# Patient Record
Sex: MALE | Race: ASIAN | HISPANIC OR LATINO | URBAN - METROPOLITAN AREA
[De-identification: names, ages, dates, MRNs, and addresses within clinical notes are randomized per-mention and may not be internally consistent; named-entity substitution may affect disease eponyms.]

---

## 2021-05-31 ENCOUNTER — RECORDS - HEALTHEAST (OUTPATIENT)
Dept: ADMINISTRATIVE | Facility: CLINIC | Age: 56
End: 2021-05-31

## 2021-06-01 ENCOUNTER — RECORDS - HEALTHEAST (OUTPATIENT)
Dept: ADMINISTRATIVE | Facility: CLINIC | Age: 56
End: 2021-06-01

## 2024-06-01 ENCOUNTER — TRANSFERRED RECORDS (OUTPATIENT)
Dept: MULTI SPECIALTY CLINIC | Facility: CLINIC | Age: 59
End: 2024-06-01

## 2024-06-01 LAB — RETINOPATHY: NORMAL

## 2024-07-23 ENCOUNTER — TRANSFERRED RECORDS (OUTPATIENT)
Dept: HEALTH INFORMATION MANAGEMENT | Facility: CLINIC | Age: 59
End: 2024-07-23

## 2024-07-25 ENCOUNTER — APPOINTMENT (OUTPATIENT)
Dept: INTERPRETER SERVICES | Facility: CLINIC | Age: 59
End: 2024-07-25
Payer: MEDICAID

## 2024-08-07 ENCOUNTER — ANCILLARY PROCEDURE (OUTPATIENT)
Dept: GENERAL RADIOLOGY | Facility: CLINIC | Age: 59
End: 2024-08-07
Attending: INTERNAL MEDICINE
Payer: COMMERCIAL

## 2024-08-07 ENCOUNTER — OFFICE VISIT (OUTPATIENT)
Dept: INTERNAL MEDICINE | Facility: CLINIC | Age: 59
End: 2024-08-07
Payer: COMMERCIAL

## 2024-08-07 VITALS
TEMPERATURE: 98.3 F | SYSTOLIC BLOOD PRESSURE: 160 MMHG | RESPIRATION RATE: 12 BRPM | HEART RATE: 97 BPM | WEIGHT: 213 LBS | HEIGHT: 64 IN | DIASTOLIC BLOOD PRESSURE: 107 MMHG | OXYGEN SATURATION: 96 % | BODY MASS INDEX: 36.37 KG/M2

## 2024-08-07 DIAGNOSIS — I10 PRIMARY HYPERTENSION: ICD-10-CM

## 2024-08-07 DIAGNOSIS — E66.01 CLASS 2 SEVERE OBESITY DUE TO EXCESS CALORIES WITH SERIOUS COMORBIDITY AND BODY MASS INDEX (BMI) OF 37.0 TO 37.9 IN ADULT (H): ICD-10-CM

## 2024-08-07 DIAGNOSIS — F33.1 MODERATE EPISODE OF RECURRENT MAJOR DEPRESSIVE DISORDER (H): ICD-10-CM

## 2024-08-07 DIAGNOSIS — E78.2 MIXED HYPERLIPIDEMIA: ICD-10-CM

## 2024-08-07 DIAGNOSIS — E11.9 TYPE 2 DIABETES MELLITUS WITHOUT COMPLICATION, WITHOUT LONG-TERM CURRENT USE OF INSULIN (H): ICD-10-CM

## 2024-08-07 DIAGNOSIS — E66.812 CLASS 2 SEVERE OBESITY DUE TO EXCESS CALORIES WITH SERIOUS COMORBIDITY AND BODY MASS INDEX (BMI) OF 37.0 TO 37.9 IN ADULT (H): ICD-10-CM

## 2024-08-07 DIAGNOSIS — G89.29 CHRONIC PAIN OF BOTH KNEES: ICD-10-CM

## 2024-08-07 DIAGNOSIS — M25.542 ARTHRALGIA OF BOTH HANDS: ICD-10-CM

## 2024-08-07 DIAGNOSIS — M25.541 ARTHRALGIA OF BOTH HANDS: ICD-10-CM

## 2024-08-07 DIAGNOSIS — Z12.5 SCREENING FOR PROSTATE CANCER: ICD-10-CM

## 2024-08-07 DIAGNOSIS — M25.561 CHRONIC PAIN OF BOTH KNEES: ICD-10-CM

## 2024-08-07 DIAGNOSIS — Z00.00 PHYSICAL EXAM, ANNUAL: Primary | ICD-10-CM

## 2024-08-07 DIAGNOSIS — G47.9 SLEEP DISTURBANCE: ICD-10-CM

## 2024-08-07 DIAGNOSIS — R07.89 OTHER CHEST PAIN: ICD-10-CM

## 2024-08-07 DIAGNOSIS — E03.9 ACQUIRED HYPOTHYROIDISM: ICD-10-CM

## 2024-08-07 DIAGNOSIS — M25.562 CHRONIC PAIN OF BOTH KNEES: ICD-10-CM

## 2024-08-07 LAB
CREAT UR-MCNC: 159 MG/DL
ERYTHROCYTE [DISTWIDTH] IN BLOOD BY AUTOMATED COUNT: 12.2 % (ref 10–15)
ERYTHROCYTE [SEDIMENTATION RATE] IN BLOOD BY WESTERGREN METHOD: 8 MM/HR (ref 0–20)
HBA1C MFR BLD: 8.4 % (ref 0–5.6)
HCT VFR BLD AUTO: 49 % (ref 40–53)
HGB BLD-MCNC: 16.1 G/DL (ref 13.3–17.7)
MCH RBC QN AUTO: 29.3 PG (ref 26.5–33)
MCHC RBC AUTO-ENTMCNC: 32.9 G/DL (ref 31.5–36.5)
MCV RBC AUTO: 89 FL (ref 78–100)
MICROALBUMIN UR-MCNC: <12 MG/L
MICROALBUMIN/CREAT UR: NORMAL MG/G{CREAT}
PLATELET # BLD AUTO: 243 10E3/UL (ref 150–450)
RBC # BLD AUTO: 5.49 10E6/UL (ref 4.4–5.9)
RHEUMATOID FACT SERPL-ACNC: <10 IU/ML
WBC # BLD AUTO: 6.5 10E3/UL (ref 4–11)

## 2024-08-07 PROCEDURE — 83036 HEMOGLOBIN GLYCOSYLATED A1C: CPT | Performed by: INTERNAL MEDICINE

## 2024-08-07 PROCEDURE — 85027 COMPLETE CBC AUTOMATED: CPT | Performed by: INTERNAL MEDICINE

## 2024-08-07 PROCEDURE — 82570 ASSAY OF URINE CREATININE: CPT | Performed by: INTERNAL MEDICINE

## 2024-08-07 PROCEDURE — G0103 PSA SCREENING: HCPCS | Performed by: INTERNAL MEDICINE

## 2024-08-07 PROCEDURE — 86038 ANTINUCLEAR ANTIBODIES: CPT | Performed by: INTERNAL MEDICINE

## 2024-08-07 PROCEDURE — 86431 RHEUMATOID FACTOR QUANT: CPT | Performed by: INTERNAL MEDICINE

## 2024-08-07 PROCEDURE — 85652 RBC SED RATE AUTOMATED: CPT | Performed by: INTERNAL MEDICINE

## 2024-08-07 PROCEDURE — 82043 UR ALBUMIN QUANTITATIVE: CPT | Performed by: INTERNAL MEDICINE

## 2024-08-07 PROCEDURE — 99214 OFFICE O/P EST MOD 30 MIN: CPT | Mod: 25 | Performed by: INTERNAL MEDICINE

## 2024-08-07 PROCEDURE — 93005 ELECTROCARDIOGRAM TRACING: CPT | Performed by: INTERNAL MEDICINE

## 2024-08-07 PROCEDURE — T1013 SIGN LANG/ORAL INTERPRETER: HCPCS | Mod: U4 | Performed by: INTERPRETER

## 2024-08-07 PROCEDURE — 99386 PREV VISIT NEW AGE 40-64: CPT | Performed by: INTERNAL MEDICINE

## 2024-08-07 PROCEDURE — 96127 BRIEF EMOTIONAL/BEHAV ASSMT: CPT | Performed by: INTERNAL MEDICINE

## 2024-08-07 PROCEDURE — 71046 X-RAY EXAM CHEST 2 VIEWS: CPT | Mod: TC | Performed by: RADIOLOGY

## 2024-08-07 PROCEDURE — 36415 COLL VENOUS BLD VENIPUNCTURE: CPT | Performed by: INTERNAL MEDICINE

## 2024-08-07 RX ORDER — ASPIRIN 81 MG/1
TABLET ORAL
COMMUNITY
Start: 2024-07-23

## 2024-08-07 RX ORDER — NAPROXEN 500 MG/1
TABLET ORAL
COMMUNITY

## 2024-08-07 RX ORDER — PIOGLITAZONEHYDROCHLORIDE 45 MG/1
TABLET ORAL
COMMUNITY
Start: 2024-07-23 | End: 2024-09-09

## 2024-08-07 RX ORDER — CYCLOBENZAPRINE HCL 10 MG
TABLET ORAL
COMMUNITY

## 2024-08-07 RX ORDER — ATORVASTATIN CALCIUM 40 MG/1
TABLET, FILM COATED ORAL
COMMUNITY
Start: 2024-07-23

## 2024-08-07 RX ORDER — GABAPENTIN 300 MG/1
CAPSULE ORAL
COMMUNITY

## 2024-08-07 RX ORDER — GLIPIZIDE 10 MG/1
TABLET, FILM COATED, EXTENDED RELEASE ORAL
COMMUNITY
Start: 2024-07-23 | End: 2024-09-09

## 2024-08-07 RX ORDER — LOSARTAN POTASSIUM AND HYDROCHLOROTHIAZIDE 25; 100 MG/1; MG/1
TABLET ORAL
COMMUNITY
Start: 2024-07-23

## 2024-08-07 RX ORDER — LEVOTHYROXINE SODIUM 150 UG/1
TABLET ORAL
COMMUNITY
Start: 2024-07-23

## 2024-08-07 SDOH — HEALTH STABILITY: PHYSICAL HEALTH: ON AVERAGE, HOW MANY DAYS PER WEEK DO YOU ENGAGE IN MODERATE TO STRENUOUS EXERCISE (LIKE A BRISK WALK)?: 2 DAYS

## 2024-08-07 SDOH — HEALTH STABILITY: PHYSICAL HEALTH: ON AVERAGE, HOW MANY MINUTES DO YOU ENGAGE IN EXERCISE AT THIS LEVEL?: 20 MIN

## 2024-08-07 ASSESSMENT — PATIENT HEALTH QUESTIONNAIRE - PHQ9
SUM OF ALL RESPONSES TO PHQ QUESTIONS 1-9: 22
10. IF YOU CHECKED OFF ANY PROBLEMS, HOW DIFFICULT HAVE THESE PROBLEMS MADE IT FOR YOU TO DO YOUR WORK, TAKE CARE OF THINGS AT HOME, OR GET ALONG WITH OTHER PEOPLE: EXTREMELY DIFFICULT
SUM OF ALL RESPONSES TO PHQ QUESTIONS 1-9: 22

## 2024-08-07 ASSESSMENT — SOCIAL DETERMINANTS OF HEALTH (SDOH): HOW OFTEN DO YOU GET TOGETHER WITH FRIENDS OR RELATIVES?: NEVER

## 2024-08-07 ASSESSMENT — PAIN SCALES - GENERAL: PAINLEVEL: MILD PAIN (3)

## 2024-08-07 NOTE — PROGRESS NOTES
Preventive Care Visit  [facility]  Clemente Frey MD, Internal Medicine  Aug 7, 2024      Assessment & Plan     (Z00.00) Physical exam, annual  (primary encounter diagnosis)  Comment: essentially normal exam  Plan: chronic conditions reviewed, some items including a number of HM items will require revisiting at his next visit later this month. Discussed.    (F33.1) Moderate episode of recurrent major depressive disorder (H)  Comment: prior therapist but not sure if he has ever been on medication.  Plan: FLUoxetine (PROZAC) 20 MG capsule        He is interested in pharmacotherapy. Appears to have both chronic depression as well as situational stress per recent notes from outside clinic--family related. Plan to see me in 3 weeks for follow up on progress.    (E11.9) Type 2 diabetes mellitus without complication, without long-term current use of insulin (H)  Comment: hx of. On multiple oral medication.   Plan: Hemoglobin A1c, Albumin Random Urine         Quantitative with Creat Ratio        Recheck labs today. Will need to assess need for eye exam at next visit--records are difficult to obtain. Would likely benefit from MTM referral, possible transition insulin or GLP-1. Will review at our next visit in 3 weeks.    (I10) Primary hypertension  Comment: elevated today though apparently a bit better in prior visits at his other clinic  Plan: will reassess in three weeks though may need adjustment. He definitely was a been distressed today.    (E66.01,  Z68.37) Class 2 severe obesity due to excess calories with serious comorbidity and body mass index (BMI) of 37.0 to 37.9 in adult (H)  Comment: associated with HTN, Hyperlipidemia and DM.  Plan: lifestyle modifications required. Somewhat hampered by mental health status at this time. Will review again in the coming month.    (E78.2) Mixed hyperlipidemia  Comment: on statin  Plan: Will plan to continue on previous medication without changes  "    (E03.9) Acquired hypothyroidism  Comment: on replacement  Plan: I believe he had recent labs though can recheck down the road.    (R07.89) Other chest pain  Comment: nonspecific, does not appear to be exertional.  Plan: EKG 12-lead, tracing only, XR Chest 2 Views        No acute ischemic changes. Atypical CP overall though there is somewhat poor R wave progression and equivocal Qs in inferior leads. No evidence of HF though likely will need to assess further if sx are not improving over the next few weeks prior to our next visit in three weeks.    (M25.541,  M25.542) Arthralgia of both hands  Comment: exam is fairly unremarkable. Some knee pain as well.  Plan: Anti Nuclear Luh IgG by IFA with Reflex, CBC         with platelets, Rheumatoid factor, ESR:         Erythrocyte sedimentation rate        Suspect OA in knees, see below. Pertinent work up but overall low suspicion for autoimmune inflammatory arthritis. Tylenol as needed.    (G47.9) Sleep disturbance  Comment: Sx suggestive of sleep apnea with sudden awakenings and snoring though could also be related to uncontrolled depression/anxiety  Plan: Adult Sleep Eval & Management          Referral        Sleep clinic referral. Selective serotonin reuptake inhibitor as above also planned    (M25.561,  M25.562,  G89.29) Chronic pain of both knees  Comment: suspected. Exam with some crepitus but otherwise no internal derangement suspected.  Plan: Physical Therapy  Referral        Willing to try physical therapy. Tylenol as needed.    See me in 3 weeks  Immunizations and cancer screening to be discussed further at that time.          BMI  Estimated body mass index is 37.04 kg/m  as calculated from the following:    Height as of this encounter: 1.615 m (5' 3.58\").    Weight as of this encounter: 96.6 kg (213 lb).       Depression Screening Follow Up        8/7/2024     2:53 PM   PHQ   PHQ-9 Total Score 22   Q9: Thoughts of better off dead/self-harm " past 2 weeks Several days   F/U: Thoughts of suicide or self-harm Yes   F/U: Self harm-plan No   F/U: Self-harm action No   F/U: Safety concerns Yes                     Follow Up Actions Taken  Patient to follow up with PCP.  Clinic staff to schedule appointment if able.    Discussed the following ways the patient can remain in a safe environment:  be around others        Fariha Arthur is a 59 year old, presenting for the following:  Annual Visit and Establish Care (He is having breathing issues with SOB especially bad since March .He has knee and joint pain.  He states he has high blood pressure. He has a number of issues to discuss with Dr. Frey. Knee pain that has caused him to fall numerous times. Please verify medications.  He states he is on 3 diabetes medication.  He can't verify the names. His hand joints are very painful..  He is very depressed and anxious.)             HPI  Pt is here to establish care as well as get a physical  Multiple concerns today, records are somewhat sparse from the [facility]             8/7/2024   General Health   How would you rate your overall physical health? (!) POOR   Feel stress (tense, anxious, or unable to sleep) Very much      (!) STRESS CONCERN      8/7/2024   Nutrition   Three or more servings of calcium each day? Yes   Diet: Regular (no restrictions)   How many servings of fruit and vegetables per day? 4 or more   How many sweetened beverages each day? 0-1            8/7/2024   Exercise   Days per week of moderate/strenous exercise 2 days   Average minutes spent exercising at this level 20 min      (!) EXERCISE CONCERN      8/7/2024   Social Factors   Frequency of gathering with friends or relatives Never   Worry food won't last until get money to buy more Yes   Food not last or not have enough money for food? Yes   Do you have housing? (Housing is defined as stable permanent housing and does not include staying ouside in a car, in a tent, in an abandoned building, in  "an overnight shelter, or couch-surfing.) Yes   Are you worried about losing your housing? Yes   Lack of transportation? Yes   Unable to get utilities (heat,electricity)? Yes   Want help with housing or utility concern? (!) YES      (!) FOOD SECURITY CONCERN PRESENT (!) TRANSPORTATION CONCERN PRESENT(!) HOUSING CONCERN PRESENT(!) FINANCIAL RESOURCE STRAIN CONCERN(!) SOCIAL CONNECTIONS CONCERN      8/7/2024   Fall Risk   Fallen 2 or more times in the past year? Yes   Trouble with walking or balance? Yes   Reason Gait Speed Test Not Completed Patient does not comprehend instructions             8/7/2024   Dental   Dentist two times every year? (!) NO            8/7/2024   TB Screening   Were you born outside of the [place]? Yes          Today's PHQ-9 Score:       8/7/2024     2:53 PM   PHQ-9 SCORE   PHQ-9 Total Score MyChart 22 (Severe depression)   PHQ-9 Total Score 22         8/7/2024   Substance Use   Alcohol more than 3/day or more than 7/wk No   Do you use any other substances recreationally? No        Social History     Tobacco Use    Smoking status: Never    Smokeless tobacco: Never   Vaping Use    Vaping status: Never Used             8/7/2024   One time HIV Screening   Previous HIV test? No          8/7/2024   STI Screening   New sexual partner(s) since last STI/HIV test? No      Last PSA: No results found for: \"PSA\"  ASCVD Risk   The ASCVD Risk score (Janay LAAL, et al., 2019) failed to calculate for the following reasons:    Cannot find a previous HDL lab    Cannot find a previous total cholesterol lab           Reviewed and updated as needed this visit by Provider                             Objective    Exam  BP (!) 157/113 (BP Location: Left arm, Patient Position: Sitting, Cuff Size: Adult Regular)   Pulse 97   Temp 98.3  F (36.8  C) (Tympanic)   Resp 12   Ht 1.615 m (5' 3.58\")   Wt 96.6 kg (213 lb)   SpO2 96%   BMI 37.04 kg/m     Estimated body mass index is 37.04 kg/m  as calculated from the " "following:    Height as of this encounter: 1.615 m (5' 3.58\").    Weight as of this encounter: 96.6 kg (213 lb).    Physical Exam  GENERAL: alert and no distress  NECK: no adenopathy, no asymmetry, masses, or scars  RESP: lungs clear to auscultation - no rales, rhonchi or wheezes  CV: regular rate and rhythm, normal S1 S2, no S3 or S4, no murmur, click or rub, no peripheral edema  ABDOMEN: soft, nontender, no hepatosplenomegaly, no masses and bowel sounds normal  MS: mild crepitus with lower leg extension bilateral. No obvious effusion. No point tenderness of significance. Good stability.  NEURO: Normal strength and tone, mentation intact and speech normal  PSYCH: mentation appears normal, affect normal/bright        Signed Electronically by: Clemente Frey MD    "

## 2024-08-07 NOTE — LETTER
August 8, 2024      Jerson Lynn  [address] [place] [address]        Dear ,    We are writing to inform you of your test results.    Jerson,   Your xray looks normal.   Your labs show that your diabetes is not well controlled but we can talk more about this when I see you again   The EKG has some minor abnormalities though it is not urgent. We can talk more about this when I see you again as well.     Resulted Orders   Anti Nuclear Luh IgG by IFA with Reflex   Result Value Ref Range    MARIE interpretation Negative Negative      Comment:        Negative:              <1:40  Borderline Positive:   1:40 - 1:80  Positive:              >1:80   CBC with platelets   Result Value Ref Range    WBC Count 6.5 4.0 - 11.0 10e3/uL    RBC Count 5.49 4.40 - 5.90 10e6/uL    Hemoglobin 16.1 13.3 - 17.7 g/dL    Hematocrit 49.0 40.0 - 53.0 %    MCV 89 78 - 100 fL    MCH 29.3 26.5 - 33.0 pg    MCHC 32.9 31.5 - 36.5 g/dL    RDW 12.2 10.0 - 15.0 %    Platelet Count 243 150 - 450 10e3/uL   Rheumatoid factor   Result Value Ref Range    Rheumatoid Factor <10 <14 IU/mL   ESR: Erythrocyte sedimentation rate   Result Value Ref Range    Erythrocyte Sedimentation Rate 8 0 - 20 mm/hr   Hemoglobin A1c   Result Value Ref Range    Hemoglobin A1C 8.4 (H) 0.0 - 5.6 %      Comment:      Normal <5.7%   Prediabetes 5.7-6.4%    Diabetes 6.5% or higher     Note: Adopted from ADA consensus guidelines.   Albumin Random Urine Quantitative with Creat Ratio   Result Value Ref Range    Creatinine Urine mg/dL 159.0 mg/dL      Comment:      The reference ranges have not been established in urine creatinine. The results should be integrated into the clinical context for interpretation.    Albumin Urine mg/L <12.0 mg/L      Comment:      The reference ranges have not been established in urine albumin. The results should be integrated into the clinical context for interpretation.    Albumin Urine mg/g Cr        Comment:      Unable to calculate, urine albumin  and/or urine creatinine is outside detectable limits.  Microalbuminuria is defined as an albumin:creatinine ratio of 17 to 299 for males and 25 to 299 for females. A ratio of albumin:creatinine of 300 or higher is indicative of overt proteinuria.  Due to biologic variability, positive results should be confirmed by a second, first-morning random or 24-hour timed urine specimen. If there is discrepancy, a third specimen is recommended. When 2 out of 3 results are in the microalbuminuria range, this is evidence for incipient nephropathy and warrants increased efforts at glucose control, blood pressure control, and institution of therapy with an angiotensin-converting-enzyme (ACE) inhibitor (if the patient can tolerate it).     PSA, screen   Result Value Ref Range    Prostate Specific Antigen Screen 1.91 0.00 - 3.50 ng/mL    Narrative    This result is obtained using the Roche Elecsys total PSA method on the [place] e801 immunoassay analyzer. Results obtained with different assay methods or kits cannot be used interchangeably.       If you have any questions or concerns, please call the clinic at the number listed above.       Sincerely,      Clemente Frey MD

## 2024-08-08 ENCOUNTER — APPOINTMENT (OUTPATIENT)
Dept: INTERPRETER SERVICES | Facility: CLINIC | Age: 59
End: 2024-08-08
Payer: COMMERCIAL

## 2024-08-08 LAB
ANA SER QL IF: NEGATIVE
ATRIAL RATE - MUSE: 72 BPM
DIASTOLIC BLOOD PRESSURE - MUSE: NORMAL MMHG
INTERPRETATION ECG - MUSE: NORMAL
P AXIS - MUSE: 19 DEGREES
PR INTERVAL - MUSE: 186 MS
PSA SERPL DL<=0.01 NG/ML-MCNC: 1.91 NG/ML (ref 0–3.5)
QRS DURATION - MUSE: 88 MS
QT - MUSE: 386 MS
QTC - MUSE: 422 MS
R AXIS - MUSE: -41 DEGREES
SYSTOLIC BLOOD PRESSURE - MUSE: NORMAL MMHG
T AXIS - MUSE: 8 DEGREES
VENTRICULAR RATE- MUSE: 72 BPM

## 2024-08-08 PROCEDURE — 93010 ELECTROCARDIOGRAM REPORT: CPT | Performed by: STUDENT IN AN ORGANIZED HEALTH CARE EDUCATION/TRAINING PROGRAM

## 2024-08-09 ENCOUNTER — THERAPY VISIT (OUTPATIENT)
Dept: PHYSICAL THERAPY | Facility: REHABILITATION | Age: 59
End: 2024-08-09
Attending: INTERNAL MEDICINE
Payer: COMMERCIAL

## 2024-08-09 DIAGNOSIS — G89.29 CHRONIC PAIN OF BOTH KNEES: Primary | ICD-10-CM

## 2024-08-09 DIAGNOSIS — M25.562 CHRONIC PAIN OF BOTH KNEES: Primary | ICD-10-CM

## 2024-08-09 DIAGNOSIS — M25.561 CHRONIC PAIN OF BOTH KNEES: Primary | ICD-10-CM

## 2024-08-09 PROCEDURE — 97110 THERAPEUTIC EXERCISES: CPT | Mod: GP | Performed by: PHYSICAL THERAPIST

## 2024-08-09 PROCEDURE — 97161 PT EVAL LOW COMPLEX 20 MIN: CPT | Mod: GP | Performed by: PHYSICAL THERAPIST

## 2024-08-09 ASSESSMENT — ACTIVITIES OF DAILY LIVING (ADL)
SWELLING: I DO NOT HAVE THE SYMPTOM
STIFFNESS: THE SYMPTOM AFFECTS MY ACTIVITY MODERATELY
SWELLING: I DO NOT HAVE THE SYMPTOM
SQUAT: I AM UNABLE TO DO THE ACTIVITY
GO UP STAIRS: ACTIVITY IS VERY DIFFICULT
AS_A_RESULT_OF_YOUR_KNEE_INJURY,_HOW_WOULD_YOU_RATE_YOUR_CURRENT_LEVEL_OF_DAILY_ACTIVITY?: SEVERELY ABNORMAL
STAND: ACTIVITY IS VERY DIFFICULT
RISE FROM A CHAIR: ACTIVITY IS FAIRLY DIFFICULT
HOW_WOULD_YOU_RATE_THE_OVERALL_FUNCTION_OF_YOUR_KNEE_DURING_YOUR_USUAL_DAILY_ACTIVITIES?: SEVERELY ABNORMAL
WEAKNESS: THE SYMPTOM AFFECTS MY ACTIVITY MODERATELY
PAIN: THE SYMPTOM AFFECTS MY ACTIVITY SLIGHTLY
GO DOWN STAIRS: ACTIVITY IS VERY DIFFICULT
GIVING WAY, BUCKLING OR SHIFTING OF KNEE: THE SYMPTOM AFFECTS MY ACTIVITY MODERATELY
KNEE_ACTIVITY_OF_DAILY_LIVING_SCORE: 35.71
RAW_SCORE: 25
PLEASE_INDICATE_YOR_PRIMARY_REASON_FOR_REFERRAL_TO_THERAPY:: KNEE
WALK: ACTIVITY IS VERY DIFFICULT
GO UP STAIRS: ACTIVITY IS VERY DIFFICULT
AS_A_RESULT_OF_YOUR_KNEE_INJURY,_HOW_WOULD_YOU_RATE_YOUR_CURRENT_LEVEL_OF_DAILY_ACTIVITY?: SEVERELY ABNORMAL
GO DOWN STAIRS: ACTIVITY IS VERY DIFFICULT
HOW_WOULD_YOU_RATE_THE_OVERALL_FUNCTION_OF_YOUR_KNEE_DURING_YOUR_USUAL_DAILY_ACTIVITIES?: SEVERELY ABNORMAL
STIFFNESS: THE SYMPTOM AFFECTS MY ACTIVITY MODERATELY
LIMPING: THE SYMPTOM AFFECTS MY ACTIVITY SEVERELY
KNEEL ON THE FRONT OF YOUR KNEE: I AM UNABLE TO DO THE ACTIVITY
SQUAT: I AM UNABLE TO DO THE ACTIVITY
PAIN: THE SYMPTOM AFFECTS MY ACTIVITY SLIGHTLY
STAND: ACTIVITY IS VERY DIFFICULT
RISE FROM A CHAIR: ACTIVITY IS FAIRLY DIFFICULT
LIMPING: THE SYMPTOM AFFECTS MY ACTIVITY SEVERELY
WEAKNESS: THE SYMPTOM AFFECTS MY ACTIVITY MODERATELY
KNEE_ACTIVITY_OF_DAILY_LIVING_SUM: 25
WALK: ACTIVITY IS VERY DIFFICULT
KNEEL ON THE FRONT OF YOUR KNEE: I AM UNABLE TO DO THE ACTIVITY
SIT WITH YOUR KNEE BENT: ACTIVITY IS MINIMALLY DIFFICULT
SIT WITH YOUR KNEE BENT: ACTIVITY IS MINIMALLY DIFFICULT
GIVING WAY, BUCKLING OR SHIFTING OF KNEE: THE SYMPTOM AFFECTS MY ACTIVITY MODERATELY
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
## 2024-08-13 ENCOUNTER — THERAPY VISIT (OUTPATIENT)
Dept: PHYSICAL THERAPY | Facility: REHABILITATION | Age: 59
End: 2024-08-13
Attending: INTERNAL MEDICINE
Payer: COMMERCIAL

## 2024-08-13 DIAGNOSIS — M25.562 CHRONIC PAIN OF BOTH KNEES: Primary | ICD-10-CM

## 2024-08-13 DIAGNOSIS — G89.29 CHRONIC PAIN OF BOTH KNEES: Primary | ICD-10-CM

## 2024-08-13 DIAGNOSIS — M25.561 CHRONIC PAIN OF BOTH KNEES: Primary | ICD-10-CM

## 2024-08-13 PROCEDURE — 97110 THERAPEUTIC EXERCISES: CPT | Mod: GP | Performed by: PHYSICAL THERAPIST

## 2024-08-13 PROCEDURE — 97140 MANUAL THERAPY 1/> REGIONS: CPT | Mod: GP | Performed by: PHYSICAL THERAPIST

## 2024-08-28 ENCOUNTER — OFFICE VISIT (OUTPATIENT)
Dept: INTERNAL MEDICINE | Facility: CLINIC | Age: 59
End: 2024-08-28
Payer: COMMERCIAL

## 2024-08-28 VITALS
SYSTOLIC BLOOD PRESSURE: 137 MMHG | OXYGEN SATURATION: 97 % | HEART RATE: 82 BPM | TEMPERATURE: 97.5 F | BODY MASS INDEX: 37.92 KG/M2 | DIASTOLIC BLOOD PRESSURE: 96 MMHG | RESPIRATION RATE: 18 BRPM | HEIGHT: 63 IN | WEIGHT: 214 LBS

## 2024-08-28 DIAGNOSIS — E11.9 TYPE 2 DIABETES MELLITUS WITHOUT COMPLICATION, WITHOUT LONG-TERM CURRENT USE OF INSULIN (H): Primary | ICD-10-CM

## 2024-08-28 DIAGNOSIS — E03.9 ACQUIRED HYPOTHYROIDISM: ICD-10-CM

## 2024-08-28 DIAGNOSIS — Z12.11 SCREEN FOR COLON CANCER: ICD-10-CM

## 2024-08-28 DIAGNOSIS — I10 PRIMARY HYPERTENSION: ICD-10-CM

## 2024-08-28 DIAGNOSIS — R94.31 ABNORMAL ELECTROCARDIOGRAM: ICD-10-CM

## 2024-08-28 DIAGNOSIS — F33.1 MODERATE EPISODE OF RECURRENT MAJOR DEPRESSIVE DISORDER (H): ICD-10-CM

## 2024-08-28 DIAGNOSIS — E78.2 MIXED HYPERLIPIDEMIA: ICD-10-CM

## 2024-08-28 LAB
ALBUMIN SERPL BCG-MCNC: 4.2 G/DL (ref 3.5–5.2)
ALP SERPL-CCNC: 85 U/L (ref 40–150)
ALT SERPL W P-5'-P-CCNC: 31 U/L (ref 0–70)
ANION GAP SERPL CALCULATED.3IONS-SCNC: 10 MMOL/L (ref 7–15)
AST SERPL W P-5'-P-CCNC: 26 U/L (ref 0–45)
BILIRUB SERPL-MCNC: 0.7 MG/DL
BUN SERPL-MCNC: 14.9 MG/DL (ref 6–20)
CALCIUM SERPL-MCNC: 9.3 MG/DL (ref 8.8–10.4)
CHLORIDE SERPL-SCNC: 102 MMOL/L (ref 98–107)
CHOLEST SERPL-MCNC: 131 MG/DL
CREAT SERPL-MCNC: 1.16 MG/DL (ref 0.67–1.17)
EGFRCR SERPLBLD CKD-EPI 2021: 73 ML/MIN/1.73M2
FASTING STATUS PATIENT QL REPORTED: YES
FASTING STATUS PATIENT QL REPORTED: YES
GLUCOSE SERPL-MCNC: 157 MG/DL (ref 70–99)
HCO3 SERPL-SCNC: 29 MMOL/L (ref 22–29)
HDLC SERPL-MCNC: 32 MG/DL
LDLC SERPL CALC-MCNC: 54 MG/DL
NONHDLC SERPL-MCNC: 99 MG/DL
POTASSIUM SERPL-SCNC: 3.4 MMOL/L (ref 3.4–5.3)
PROT SERPL-MCNC: 7.4 G/DL (ref 6.4–8.3)
SODIUM SERPL-SCNC: 141 MMOL/L (ref 135–145)
TRIGL SERPL-MCNC: 226 MG/DL
TSH SERPL DL<=0.005 MIU/L-ACNC: 2.59 UIU/ML (ref 0.3–4.2)

## 2024-08-28 PROCEDURE — 99214 OFFICE O/P EST MOD 30 MIN: CPT | Performed by: INTERNAL MEDICINE

## 2024-08-28 PROCEDURE — T1013 SIGN LANG/ORAL INTERPRETER: HCPCS | Mod: U4 | Performed by: INTERPRETER

## 2024-08-28 PROCEDURE — 36415 COLL VENOUS BLD VENIPUNCTURE: CPT | Performed by: INTERNAL MEDICINE

## 2024-08-28 PROCEDURE — 84443 ASSAY THYROID STIM HORMONE: CPT | Performed by: INTERNAL MEDICINE

## 2024-08-28 PROCEDURE — 80053 COMPREHEN METABOLIC PANEL: CPT | Performed by: INTERNAL MEDICINE

## 2024-08-28 PROCEDURE — 80061 LIPID PANEL: CPT | Performed by: INTERNAL MEDICINE

## 2024-08-28 PROCEDURE — G2211 COMPLEX E/M VISIT ADD ON: HCPCS | Performed by: INTERNAL MEDICINE

## 2024-08-28 RX ORDER — FLUOXETINE 40 MG/1
40 CAPSULE ORAL DAILY
Qty: 90 CAPSULE | Refills: 1 | Status: SHIPPED | OUTPATIENT
Start: 2024-08-28

## 2024-08-28 NOTE — LETTER
August 29, 2024      Jerson Lynn  [address] [place] [address]        Dear ,    We are writing to inform you of your test results.        Resulted Orders   Lipid panel reflex to direct LDL Non-fasting   Result Value Ref Range    Cholesterol 131 <200 mg/dL    Triglycerides 226 (H) <150 mg/dL    Direct Measure HDL 32 (L) >=40 mg/dL    LDL Cholesterol Calculated 54 <=100 mg/dL    Non HDL Cholesterol 99 <130 mg/dL    Patient Fasting > 8hrs? Yes     Narrative    Cholesterol  Desirable:  <200 mg/dL    Triglycerides  Normal:  Less than 150 mg/dL  Borderline High:  150-199 mg/dL  High:  200-499 mg/dL  Very High:  Greater than or equal to 500 mg/dL    Direct Measure HDL  Female:  Greater than or equal to 50 mg/dL   Male:  Greater than or equal to 40 mg/dL    LDL Cholesterol  Desirable:  <100mg/dL  Above Desirable:  100-129 mg/dL   Borderline High:  130-159 mg/dL   High:  160-189 mg/dL   Very High:  >= 190 mg/dL    Non HDL Cholesterol  Desirable:  130 mg/dL  Above Desirable:  130-159 mg/dL  Borderline High:  160-189 mg/dL  High:  190-219 mg/dL  Very High:  Greater than or equal to 220 mg/dL   TSH WITH FREE T4 REFLEX   Result Value Ref Range    TSH 2.59 0.30 - 4.20 uIU/mL   Comprehensive metabolic panel   Result Value Ref Range    Sodium 141 135 - 145 mmol/L    Potassium 3.4 3.4 - 5.3 mmol/L    Carbon Dioxide (CO2) 29 22 - 29 mmol/L    Anion Gap 10 7 - 15 mmol/L    Urea Nitrogen 14.9 6.0 - 20.0 mg/dL    Creatinine 1.16 0.67 - 1.17 mg/dL    GFR Estimate 73 >60 mL/min/1.73m2      Comment:      eGFR calculated using 2021 CKD-EPI equation.    Calcium 9.3 8.8 - 10.4 mg/dL      Comment:      Reference intervals for this test were updated on 7/16/2024 to reflect our healthy population more accurately. There may be differences in the flagging of prior results with similar values performed with this method. Those prior results can be interpreted in the context of the updated reference intervals.    Chloride 102 98 - 107  mmol/L    Glucose 157 (H) 70 - 99 mg/dL    Alkaline Phosphatase 85 40 - 150 U/L    AST 26 0 - 45 U/L    ALT 31 0 - 70 U/L    Protein Total 7.4 6.4 - 8.3 g/dL    Albumin 4.2 3.5 - 5.2 g/dL    Bilirubin Total 0.7 <=1.2 mg/dL    Patient Fasting > 8hrs? Yes        If you have any questions or concerns, please call the clinic at the number listed above.       Sincerely,      Clemente Frey MD

## 2024-08-28 NOTE — PROGRESS NOTES
Assessment & Plan     (E11.9) Type 2 diabetes mellitus without complication, without long-term current use of insulin (H)  (primary encounter diagnosis)  Comment: suboptimal control, A1c of 8.4%. Somewhat unclear as to his recent medication adherence (on four oral DM meds) though he states that his current regimen is correct.  Plan: Med Therapy Management Referral        Would recommend GLP-1 if he is willing though he would like to ensure that his thyroid lab is normal first. He is not very physically active and questionable diet--  Recommended MTM referral to review GLP-1 options and ramp up, if he is willing. Supplies were given last visit, encouraged to use for monitoring purposes    (I10) Primary hypertension  Comment: still slightly elevated  Plan: Med Therapy Management Referral        Discussed that he is still not at goal. He is still hoping that this will improve on its own. Will have a chance to recheck at MTM visit and he will also see me again in 2 months. Lifestyle modifications again recommended.    (E03.9) Acquired hypothyroidism  Comment: has been stable  Plan: TSH WITH FREE T4 REFLEX        Recheck at current dose.    (F33.1) Moderate episode of recurrent major depressive disorder (H)  Comment: some improvement with 20mg  Plan: FLUoxetine (PROZAC) 40 MG capsule        Willing to increase to 40mg. Follow up in 2 months.    (E78.2) Mixed hyperlipidemia  Comment: on statin  Plan: Lipid panel reflex to direct LDL Non-fasting,         Comprehensive metabolic panel        Will plan to continue on previous medication without changes     (R94.31) Abnormal electrocardiogram  Comment: questionable inferior infarct.  Plan: Echocardiogram Complete        Echo ordered as he is still having some shortness of breath though no sign of CHF and he is quite sedentary overall so I suspect this is more likely deconditioning.    Due to complexity of other issues and time, we deferred colon cancer screening discussion,  "will review at his visit this fall.    The longitudinal plan of care for the diagnosis(es)/condition(s) as documented were addressed during this visit. Due to the added complexity in care, I will continue to support Jerson in the subsequent management and with ongoing continuity of care.       BMI  Estimated body mass index is 37.91 kg/m  as calculated from the following:    Height as of this encounter: 1.6 m (5' 3\").    Weight as of this encounter: 97.1 kg (214 lb).             Fariha Arthur is a 59 year old, presenting for the following health issues:  Office Visit (Pt visit today is to get Xray results and blood work results. Pt says his high blood pressure is due to depression and sleeplessness. Pt says he has taken Fluoxetine for 2 weeks and it hasn't helped.)      8/28/2024     3:46 PM   Additional Questions   Roomed by mich walls   Accompanied by alone     Via the Health Maintenance questionnaire, the patient has reported the following services have been completed -Eye Exam: Ricki Muir 2024-06-01, this information has been sent to the abstraction team.  History of Present Illness       Reason for visit:  Test results   He is taking medications regularly.                     Objective    BP (!) 137/96   Pulse 82   Temp 97.5  F (36.4  C) (Tympanic)   Resp 18   Ht 1.6 m (5' 3\")   Wt 97.1 kg (214 lb)   SpO2 97%   BMI 37.91 kg/m    Body mass index is 37.91 kg/m .  Physical Exam               Signed Electronically by: Clemente Frey MD    "

## 2024-09-04 ENCOUNTER — THERAPY VISIT (OUTPATIENT)
Dept: PHYSICAL THERAPY | Facility: REHABILITATION | Age: 59
End: 2024-09-04
Attending: INTERNAL MEDICINE
Payer: COMMERCIAL

## 2024-09-04 DIAGNOSIS — M25.562 CHRONIC PAIN OF BOTH KNEES: Primary | ICD-10-CM

## 2024-09-04 DIAGNOSIS — M25.561 CHRONIC PAIN OF BOTH KNEES: Primary | ICD-10-CM

## 2024-09-04 DIAGNOSIS — G89.29 CHRONIC PAIN OF BOTH KNEES: Primary | ICD-10-CM

## 2024-09-04 PROCEDURE — 97530 THERAPEUTIC ACTIVITIES: CPT | Mod: GP | Performed by: PHYSICAL THERAPIST

## 2024-09-04 PROCEDURE — 97110 THERAPEUTIC EXERCISES: CPT | Mod: GP | Performed by: PHYSICAL THERAPIST

## 2024-09-04 PROCEDURE — 97140 MANUAL THERAPY 1/> REGIONS: CPT | Mod: GP | Performed by: PHYSICAL THERAPIST

## 2024-09-09 ENCOUNTER — OFFICE VISIT (OUTPATIENT)
Dept: PHARMACY | Facility: CLINIC | Age: 59
End: 2024-09-09
Attending: INTERNAL MEDICINE
Payer: COMMERCIAL

## 2024-09-09 DIAGNOSIS — E78.2 MIXED HYPERLIPIDEMIA: ICD-10-CM

## 2024-09-09 DIAGNOSIS — M25.562 CHRONIC PAIN OF BOTH KNEES: ICD-10-CM

## 2024-09-09 DIAGNOSIS — G89.29 CHRONIC PAIN OF BOTH KNEES: ICD-10-CM

## 2024-09-09 DIAGNOSIS — E03.9 ACQUIRED HYPOTHYROIDISM: ICD-10-CM

## 2024-09-09 DIAGNOSIS — F32.89 OTHER DEPRESSION: ICD-10-CM

## 2024-09-09 DIAGNOSIS — E11.9 TYPE 2 DIABETES MELLITUS WITHOUT COMPLICATION, WITHOUT LONG-TERM CURRENT USE OF INSULIN (H): Primary | ICD-10-CM

## 2024-09-09 DIAGNOSIS — M25.561 CHRONIC PAIN OF BOTH KNEES: ICD-10-CM

## 2024-09-09 DIAGNOSIS — F33.1 MODERATE EPISODE OF RECURRENT MAJOR DEPRESSIVE DISORDER (H): ICD-10-CM

## 2024-09-09 DIAGNOSIS — I10 PRIMARY HYPERTENSION: ICD-10-CM

## 2024-09-09 PROCEDURE — 99605 MTMS BY PHARM NP 15 MIN: CPT

## 2024-09-09 PROCEDURE — 99607 MTMS BY PHARM ADDL 15 MIN: CPT

## 2024-09-09 RX ORDER — BLOOD-GLUCOSE SENSOR
EACH MISCELLANEOUS
Qty: 6 EACH | Refills: 5 | Status: SHIPPED | OUTPATIENT
Start: 2024-09-09

## 2024-09-09 RX ORDER — OLANZAPINE 5 MG/1
5 TABLET ORAL AT BEDTIME
Qty: 90 TABLET | Refills: 3 | Status: SHIPPED | OUTPATIENT
Start: 2024-09-09

## 2024-09-09 RX ORDER — GLIPIZIDE 10 MG/1
10 TABLET, FILM COATED, EXTENDED RELEASE ORAL 2 TIMES DAILY
Qty: 180 TABLET | Refills: 2 | Status: SHIPPED | OUTPATIENT
Start: 2024-09-09

## 2024-09-09 NOTE — PROGRESS NOTES
Medication Therapy Management (MTM) Encounter    ASSESSMENT:                            Medication Adherence/Access:  Patient seem to not remember some of his medications. Advised to bring them next follow up    Diabetes Type II: A1C above goal < 7%. No home BG at this time. Considering high sugar readings reasonable to start other injectable medications. Since patient is also over weight considering zepound is covered reasonable to order it for weight loss and also sugar control. Patient could benefit from CGM order; order sent to pharmacy. Pioglitazone has a black box warning for causing or worsening CHF, and was discontinued and glipizide XL dose was increased.     Hypertension: In clinic BP above goal of < 130/80. No home  BP at this time. Advised patient to start checking his BP. Will follow up closely.     Hyperlipidemia:Stable on atorvastatin 40 mg     Chronic Pain: Stable on current medications     Hypothyroidism:Stable on levothyroxine 150 mcg daily     Mental Health   Anxiety, Depression, and Insomnia: Considering patient is having racing thoughts reasonable to order olanzapine; the combination of olanzapine and fluoxetine is also indicated for treatment of resistant depression. Will follow up closely.     PLAN:                            Increase glipizide XL dose from 10 mg daily to 10 mg BID  Discontinue pioglitazone considering the risk of CHF   Start injecting Zepbound 2.5 mg daily   Start olanzapine 5 mg daily at bedtime    Start using farida 3 to check your blood sugar     Follow-up: Due on 10/09/2024 @ 10 AM    SUBJECTIVE/OBJECTIVE:                          Jerson Lynn is a 59 year old male seen for an initial visit. He was referred to me from Dr. Frey. Name: (Mila  ID: [id number])    Reason for visit: Medication Review and Diabetes Type II.    Allergies/ADRs: Reviewed in chart  Past Medical History: Reviewed in chart  Tobacco: He reports that he has never smoked. He has never used smokeless  "tobacco.  Alcohol: none  Medication Adherence/Access: Patient seem to not remember some of his medications. Advised to bring them next follow up    Diabetes  Type II:    Jardiance 25 mg daily    Glipizide XL 10 mg daily    Metformin 1000 mg BID   Pioglitazone 45 mg daily   Aspirin 81mg daily  Patient is not experiencing side effects.  Current diabetes symptoms: polyuria, polydipsia, polyphagia, fatigue, and numbness/tingling    Blood sugar monitorin time(s) week; he does check it twice weekly because of the lancets or test strips Ranges: (patient reported) Fasting- None  Post-Prandial- None   Diet/Exercise: Patient noted      Eye exam is up to date  Foot exam: due    There were no vitals filed for this visit.    Estimated body mass index is 37.91 kg/m  as calculated from the following:    Height as of 24: 5' 3\" (1.6 m).    Weight as of 24: 214 lb (97.1 kg).    Lab Results   Component Value Date    A1C 8.4 2024     Results  CGM - Dexcom: Dexcom G7  and Sensors $0  CGM - Freestyle Fabien: Fabien 3 [place] and Sensors $0  DPP-4 - Januvia: $0  DPP-4 - Onglyza:   DPP-4 - Tradjenta:   GLP-1 - Adlyxin:   GLP-1 - Bydureon:   GLP-1 - Byetta:   GLP-1 - Mounjaro:   GLP-1 - Ozempic: $0  GLP-1 - Rybelsus:   GLP-1 - Trulicity: PA Required  GLP-1 - Victoza: $0  Glucagon - Baqsimi:   Glucagon:   Glucagon - Gvoke:   Long-acting Insulin - Basaglar:   Long-acting Insulin - Lantus:   Long-acting Insulin - Levemir:   Long-acting Insulin - Semglee:   Long-acting Insulin - Toujeo:   Long-acting Insulin - Tresiba:   Omnipod:   Soliqua:   Xultophy:   NPH Insulin - Humulin N:   NPH Insulin - Novolin N:   Rapid-acting Insulin - Admelog:   Rapid-acting Insulin - Apidra:   Rapid-acting Insulin - Fiasp:   Rapid-acting Insulin - Humalog:   Rapid-acting Insulin - Lyumjev:   Rapid-acting Insulin - Novolog:   SGLT-2 - Brenzavvy:   SGLT-2 - Farxiga:   SGLT-2 - Invokana:   SGLT-2 - Jardiance:   SGLT-2 - Steglatro:   Testing " "Supplies - AccuCheck:   Testing Supplies - Capri Contour:   Testing Supplies - OneTouch:   Weight Loss - Saxenda: $0 copay  Weight Loss - Wegovy: $0 copay  Weight Loss - Zepbound: $0 copay     Hypertension    Hyzaar 100 - 25 mg daily     Patient reports no current medication side effects  Patient does not self-monitor blood pressure.  Patient asked for new BP cuff. Order sent to pharmacy.     BP Readings from Last 3 Encounters:   08/28/24 (!) 137/96   08/07/24 (!) 160/107        Hyperlipidemia    Atorvastatin 40 mg daily   Not side effects of muscle pain, but patient complained of having pain on his back up leg.  The 10-year ASCVD risk score (Janay LALA, et al., 2019) is: 15%    Values used to calculate the score:      Age: 59 years      Sex: Male      Is Non- : No      Diabetic: Yes      Tobacco smoker: No      Systolic Blood Pressure: 137 mmHg      Is BP treated: Yes      HDL Cholesterol: 32 mg/dL      Total Cholesterol: 131 mg/dL       Chronic Pain:   Naproxen 500 mg twice daily    Gabapentin 300 mg BID    Cyclobenzaprine 10 mg twice daily as needed for muscle cramping   Patient noted this medication helps for his pain.        Hypothyroidism    Levothyroxine 150 mcg daily   Patient is having the following symptoms: hypothyroidism -  myalgias, muscle cramps, fatigue, and weight gain and hyperthyroidism -  insomnia.  Patient's symptoms might not be due to the thyroid,but other conditions, but he has these related symptoms.   TSH   Date Value Ref Range Status   08/28/2024 2.59 0.30 - 4.20 uIU/mL Final     No results found for: \"T4\"       Mental Health     Anxiety, Depression, and Insomnia   Fluoxetine 40 mg daily   Patient has been taking 20 mg and the dose was increased to 40 mg daily     Patient reports no current medication side effects.  Patient is wondering if these medication are causing him to gain weight     Previously, patient was active, but he went through divorce and that is " affecting his physical and mental health. This has been going on for the last 10 years. He noted he is not able to sleep due to his current situation. We discussed about starting olanzapine.     Today's Vitals: There were no vitals taken for this visit.  ----------------      I spent 60 minutes with this patient today. All changes were made via collaborative practice agreement with Clemente Frey MD. A copy of the visit note was provided to the patient's provider(s).    A summary of these recommendations was given to the patient.       Medication Therapy Recommendations  Moderate episode of recurrent major depressive disorder (H)    Current Medication: OLANZapine (ZYPREXA) 5 MG tablet   Rationale: Untreated condition - Needs additional medication therapy - Indication   Recommendation: Start Medication - OLANZapine 5 MG tablet   Status: Accepted per [facility]         Type 2 diabetes mellitus without complication, without long-term current use of insulin (H)    Current Medication: glipiZIDE (GLUCOTROL XL) 10 MG 24 hr tablet   Rationale: Dose too low - Dosage too low - Effectiveness   Recommendation: Increase Dose - Increase glipizide XL dose from 10 mg daily to BID   Status: Accepted per [facility]          Current Medication: pioglitazone (ACTOS) 45 MG tablet (Discontinued)   Rationale: Unsafe medication for the patient - Adverse medication event - Safety   Recommendation: Discontinue Medication   Status: Accepted per [facility]          Current Medication: tirzepatide-Weight Management (ZEPBOUND) 2.5 MG/0.5ML prefilled pen   Rationale: Untreated condition - Needs additional medication therapy - Indication   Recommendation: Start Medication - Zepbound 2.5 MG/0.5ML Soaj   Status: Accepted per [facility]              Stefanie Funk, PharmD     Medication Therapy Management (MTM) Pharmacist     [phone]     [email or web address]

## 2024-09-09 NOTE — PATIENT INSTRUCTIONS
"Recommendations from today's MTM visit:                                                      MTM (medication therapy management) is a service provided by a clinical pharmacist designed to help you get the most of out of your medicines.   Today we reviewed what your medicines are for, how to know if they are working, that your medicines are safe and how to make your medicine regimen as easy as possible.      Increase glipizide XL dose from 10 mg daily to 10 mg BID  Discontinue pioglitazone considering the risk of CHF   Start injecting Zepbound 2.5 mg daily   Start olanzapine 5 mg daily at bedtime    Start using farida 3 to check your blood sugar     Follow-up: Due on 10/09/2024 @ 10 AM    It was great speaking with you today.  I value your experience and would be very thankful for your time in providing feedback in our clinic survey. In the next few days, you may receive an email or text message from [employer] with a link to a survey related to your  clinical pharmacist.\"     To schedule another MTM appointment, please call the clinic directly or you may call the MTM scheduling line at [phone].    My Clinical Pharmacist's contact information:                                                      Please feel free to contact me with any questions or concerns you have.        Stefanie Funk, PharmD     Medication Therapy Management (MTM) Pharmacist     [phone]     [email or web address]     [facility]  "

## 2024-09-11 ENCOUNTER — THERAPY VISIT (OUTPATIENT)
Dept: PHYSICAL THERAPY | Facility: REHABILITATION | Age: 59
End: 2024-09-11
Payer: COMMERCIAL

## 2024-09-11 DIAGNOSIS — M25.562 CHRONIC PAIN OF BOTH KNEES: Primary | ICD-10-CM

## 2024-09-11 DIAGNOSIS — G89.29 CHRONIC PAIN OF BOTH KNEES: Primary | ICD-10-CM

## 2024-09-11 DIAGNOSIS — M25.561 CHRONIC PAIN OF BOTH KNEES: Primary | ICD-10-CM

## 2024-09-11 PROCEDURE — 97110 THERAPEUTIC EXERCISES: CPT | Mod: GP | Performed by: PHYSICAL THERAPIST

## 2024-09-11 PROCEDURE — 97140 MANUAL THERAPY 1/> REGIONS: CPT | Mod: GP | Performed by: PHYSICAL THERAPIST

## 2024-09-11 PROCEDURE — 97530 THERAPEUTIC ACTIVITIES: CPT | Mod: GP | Performed by: PHYSICAL THERAPIST

## 2024-09-12 ENCOUNTER — TELEPHONE (OUTPATIENT)
Dept: INTERNAL MEDICINE | Facility: CLINIC | Age: 59
End: 2024-09-12

## 2024-09-12 NOTE — TELEPHONE ENCOUNTER
Please start prior authorization:     Disp Refills Start End NORBERT   tirzepatide-Weight Management (ZEPBOUND) 2.5 MG/0.5ML prefilled pen 2 mL 0 9/9/2024 -- No   Sig - Route: Inject 0.5 mLs (2.5 mg) subcutaneously every 7 days. - Subcutaneous   Sent to pharmacy as: Tirzepatide-Weight Management 2.5 MG/0.5ML Subcutaneous Solution Auto-injector (ZEPBOUND)   Class: E-Prescribe   Order: [id number]   E-Prescribing Status: Receipt confirmed by pharmacy (9/9/2024  9:26 AM CDT)       Pharmacy    [facility] - [place], [place] - [facility] [place] [address]       Prescribing Provider's NPI: [id number]  Clemente Frey

## 2024-09-16 NOTE — TELEPHONE ENCOUNTER
PA Initiation    Medication: ZEPBOUND 2.5 MG/0.5ML SC SOAJ  Insurance Company: Daniel - Phone [phone] Fax [phone]  Pharmacy Filling the Rx: [facility] - [place], [place] - [address]  Filling Pharmacy Phone: [phone]  Filling Pharmacy Fax:    Start Date: 9/16/2024  Retail Pharmacy Prior Authorization Team   Phone: [phone]

## 2024-09-18 ENCOUNTER — THERAPY VISIT (OUTPATIENT)
Dept: PHYSICAL THERAPY | Facility: REHABILITATION | Age: 59
End: 2024-09-18
Payer: COMMERCIAL

## 2024-09-18 DIAGNOSIS — M25.561 CHRONIC PAIN OF BOTH KNEES: Primary | ICD-10-CM

## 2024-09-18 DIAGNOSIS — M25.562 CHRONIC PAIN OF BOTH KNEES: Primary | ICD-10-CM

## 2024-09-18 DIAGNOSIS — G89.29 CHRONIC PAIN OF BOTH KNEES: Primary | ICD-10-CM

## 2024-09-18 PROCEDURE — T1013 SIGN LANG/ORAL INTERPRETER: HCPCS | Mod: U4 | Performed by: INTERPRETER

## 2024-09-18 PROCEDURE — 97140 MANUAL THERAPY 1/> REGIONS: CPT | Mod: GP | Performed by: PHYSICAL THERAPIST

## 2024-09-18 PROCEDURE — 97110 THERAPEUTIC EXERCISES: CPT | Mod: GP | Performed by: PHYSICAL THERAPIST

## 2024-09-18 PROCEDURE — 97530 THERAPEUTIC ACTIVITIES: CPT | Mod: GP | Performed by: PHYSICAL THERAPIST

## 2024-09-18 NOTE — PROGRESS NOTES
09/18/24 0500   Appointment Info   Signing clinician's name / credentials Elizabeth Nagel-Spearfish, PT DPT   Total/Authorized Visits 6   Visits Used 5   Medical Diagnosis M25.561, M25.562, G89.29 (ICD-10-CM) - Chronic pain of both knees   PT Tx Diagnosis knee pain with weight bearing activity intolerance   Precautions/Limitations none   Progress Note/Certification   Start of Care Date 08/09/24   Onset of illness/injury or Date of Surgery 08/07/24   Therapy Frequency every other week   Predicted Duration 12 weeks   Certification date from 08/09/24   Certification date to 11/01/24   Progress Note Due Date 11/01/24       Present Yes    Language Hmong    ID or First/Last Name Paly   GOALS   PT Goals 3;2   PT Goal 1   Goal Identifier 10 meter walk test   Goal Description Patient will improve gait speed during 10 meter walk test by at least 0.2 meters per second compared to baseline to reflect significant improvements in gait speed and improved mobility   Goal Progress goal met   Target Date 11/01/24   Date Met 09/18/24   PT Goal 2   Goal Identifier 30 sec sit to stand   Goal Description Patient will achieve a score of 10 or greater repetitions in 30 second sit to stand test to reflect improvements towards normative trunk and lower extremity strength values.   Goal Progress 7 times today with difficulty due to pain in the knee. goal not met today due to pain.   Target Date 11/01/24   PT Goal 3   Goal Identifier single leg balance   Goal Description Patient will improve single leg balance to at least 10 seconds without assistance to improve gait quality and tolerance with weight bearing activity   Goal Progress 15 seconds rosamaria good tolerance. goal met.   Target Date 11/01/24   Date Met 09/18/24   Subjective Report   Subjective Report the patient reports that the muscles still feel quite a bit better, but the pain inside the knee feels about the same. had an injection in April that  "was somewhat helpful.   Objective Measures   Objective Measures Objective Measure 1;Objective Measure 2;Objective Measure 3   Objective Measure 1   Objective Measure 30 second sit to stand   Details 7 reps   Objective Measure 2   Objective Measure palpation   Details tender to hamstring and gastroc   Objective Measure 3   Objective Measure Knee AROM   Details knee flexion 100 deg, knee extension lacking 8 deg   Treatment Interventions (PT)   Interventions Therapeutic Procedure/Exercise;Therapeutic Activity;Neuromuscular Re-education;Gait Training;Manual Therapy;Self Care/Home Management   Therapeutic Procedure/Exercise   Therapeutic Procedures: strength, endurance, ROM, flexibility minutes (58564) 15   Ther Proc 1 nustep x5 min level 4   PTRx Ther Proc 1 Hip Flexion Straight Leg Raise   PTRx Ther Proc 1 - Details improved from previous x10   PTRx Ther Proc 2 Hip Abduction Straight Leg Raise   PTRx Ther Proc 2 - Details x10   PTRx Ther Proc 3 Hip Extension Straight Leg Raise   PTRx Ther Proc 3 - Details x10   PTRx Ther Proc 4 Standing Gastroc Stretch   PTRx Ther Proc 4 - Details 3x30\" holds    PTRx Ther Proc 5 Seated Hamstring Stretch   PTRx Ther Proc 5 - Details 3x30\" holds   Skilled Intervention Patient was instructed in their home exercise program. Patient performed at least 1 set of each exercise during the session. Cues were provided to ensure proper movement and control. Patient reported appropriate tolerance with each exercise.   Patient Response/Progress tolerated well good fatigue   Therapeutic Activity   Therapeutic Activities: dynamic activities to improve functional performance minutes (18826) 8   Ther Act 1 side stepping   Ther Act 1 - Details x3 length of gym   PTRx Ther Act 1 wall squat   PTRx Ther Act 1 - Details x10   PTRx Ther Act 2 Lateral Step Down   PTRx Ther Act 2 - Details 4\" box x10    Skilled Intervention Performed to improve technique and tolerance with dynamic functional activities and " movement patterns related to activities of daily living, occupation, home, and/or wellness activities.   Neuromuscular Re-education   Skilled Intervention Performed to improve movement coordination, static and dynamic balance, kinesthetic awareness and positioning of involved body regions in the environment.   Manual Therapy   Manual Therapy: Mobilization, MFR, MLD, friction massage minutes (89778) 13   Manual Therapy 1 STM to hamstring and gastroc/soleus in prone   Manual Therapy 1 - Details educated on home massage roller stick on [employer]   Skilled Intervention Performed interventions to improve mobility and reduce pain in the involved body regions to facilitate improved tolerance and performance with exercise and activities of daily living.   Patient Response/Progress improved pain and tightness following   Self Care/home Management   Skilled Intervention Education regarding mechanisms and rationale for physical therapy interventions selected to address their goals. Discussed self management strategies for ways patient can actively support progress towards goals. Education regarding activity precautions/restrictions. Addressed patients questions and concerns to their satisfaction prior to completion of visit.   Education   Learner/Method Patient   Plan   Home program ptrx on phone   Comments   Comments The patient has attended 5 visits of PT for treatment of his right knee pain. patient is reporting 50% improvement in symptoms overall, but does continue to demonstrate deficits with knee AROM and pain impacting his function. At this point it is appropriate for the patient to continue independently with HEP and is planning to reach out to his referring provider for a potential repeat knee injection.       PLAN  Follow up with MD     Beginning/End Dates of Progress Note Reporting Period:    to 09/18/2024    Referring Provider:  Clemente Frey

## 2024-09-19 NOTE — TELEPHONE ENCOUNTER
Prior Authorization Approval    Medication: ZEPBOUND 2.5 MG/0.5ML SC SOAJ  Authorization Effective Date: 9/18/2024  Authorization Expiration Date: 3/18/2025  Approved Dose/Quantity: 2 MLS  Reference #:     Insurance Company: Daniel - Phone [phone] Fax [phone]  Expected CoPay: $    CoPay Card Available:      Financial Assistance Needed: No  Which Pharmacy is filling the prescription: [facility] - [place], [place] - [address]  Pharmacy Notified: Yes  Patient Notified: The pharmacy will notify the patient.

## 2024-09-26 ENCOUNTER — OFFICE VISIT (OUTPATIENT)
Dept: SLEEP MEDICINE | Facility: CLINIC | Age: 59
End: 2024-09-26
Attending: INTERNAL MEDICINE
Payer: COMMERCIAL

## 2024-09-26 VITALS
OXYGEN SATURATION: 92 % | DIASTOLIC BLOOD PRESSURE: 87 MMHG | WEIGHT: 215.1 LBS | SYSTOLIC BLOOD PRESSURE: 126 MMHG | HEIGHT: 63 IN | BODY MASS INDEX: 38.11 KG/M2 | HEART RATE: 95 BPM

## 2024-09-26 DIAGNOSIS — E66.9 OBESITY (BMI 30-39.9): ICD-10-CM

## 2024-09-26 DIAGNOSIS — G47.19 EXCESSIVE DAYTIME SLEEPINESS: ICD-10-CM

## 2024-09-26 DIAGNOSIS — G47.00 INSOMNIA, UNSPECIFIED TYPE: ICD-10-CM

## 2024-09-26 DIAGNOSIS — R06.81 WITNESSED APNEIC SPELLS: ICD-10-CM

## 2024-09-26 DIAGNOSIS — R06.83 SNORING: ICD-10-CM

## 2024-09-26 DIAGNOSIS — G47.9 SLEEP DISTURBANCE: Primary | ICD-10-CM

## 2024-09-26 PROCEDURE — 99203 OFFICE O/P NEW LOW 30 MIN: CPT | Performed by: NURSE PRACTITIONER
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
## 2024-10-09 ENCOUNTER — LAB (OUTPATIENT)
Dept: LAB | Facility: CLINIC | Age: 59
End: 2024-10-09
Payer: COMMERCIAL

## 2024-10-09 ENCOUNTER — OFFICE VISIT (OUTPATIENT)
Dept: PHARMACY | Facility: CLINIC | Age: 59
End: 2024-10-09
Payer: COMMERCIAL

## 2024-10-09 DIAGNOSIS — M25.562 CHRONIC PAIN OF BOTH KNEES: ICD-10-CM

## 2024-10-09 DIAGNOSIS — G89.29 CHRONIC PAIN OF BOTH KNEES: ICD-10-CM

## 2024-10-09 DIAGNOSIS — M25.561 CHRONIC PAIN OF BOTH KNEES: ICD-10-CM

## 2024-10-09 DIAGNOSIS — E11.9 TYPE 2 DIABETES MELLITUS WITHOUT COMPLICATION, WITHOUT LONG-TERM CURRENT USE OF INSULIN (H): Primary | ICD-10-CM

## 2024-10-09 DIAGNOSIS — I10 PRIMARY HYPERTENSION: ICD-10-CM

## 2024-10-09 DIAGNOSIS — F33.1 MODERATE EPISODE OF RECURRENT MAJOR DEPRESSIVE DISORDER (H): ICD-10-CM

## 2024-10-09 PROCEDURE — 99606 MTMS BY PHARM EST 15 MIN: CPT

## 2024-10-09 PROCEDURE — 99607 MTMS BY PHARM ADDL 15 MIN: CPT

## 2024-10-09 PROCEDURE — 84550 ASSAY OF BLOOD/URIC ACID: CPT

## 2024-10-09 PROCEDURE — 36415 COLL VENOUS BLD VENIPUNCTURE: CPT

## 2024-10-09 RX ORDER — HYDROCHLOROTHIAZIDE 12.5 MG/1
CAPSULE ORAL
Qty: 6 EACH | Refills: 11 | Status: SHIPPED | OUTPATIENT
Start: 2024-10-09

## 2024-10-09 NOTE — Clinical Note
Yael Frey, and Celi,  Patient has been complaining of knee pain and was wondering if he could benefit from pain referral. He wanted to be eligible for social security income and also handicapped parking;unsure on how to guide him with social security income (disability evaluation); He would definitely need application guide even if he has to do it through the [place] human services.   Your help is appreciated!  Thanks, Stefanie

## 2024-10-09 NOTE — PATIENT INSTRUCTIONS
"Recommendations from today's MTM visit:                                                      MTM (medication therapy management) is a service provided by a clinical pharmacist designed to help you get the most of out of your medicines.   Today we reviewed what your medicines are for, how to know if they are working, that your medicines are safe and how to make your medicine regimen as easy as possible.      Please bring your medications and your glucometer and farida reading with you next time   Increase zepbound dose from 2.5 mg to 5 mg weekly   PharmD to refer patient to care coordinators and then possibly  to get help with disability evaluation and social security income  PharmD provided patient [employer] prescription program form to be filled   Discontinue taking olanzapine due to sleepiness    PharmD to order Uric acid levels and send patient to the lab for blood work  PharmD to communicate with PCP to refer him for his knee Pain  PharmD to communicate with PCP if he can be able to get handicapped parking     Follow-up: Due on 11/13/2024 @ 10 AM    It was great speaking with you today.  I value your experience and would be very thankful for your time in providing feedback in our clinic survey. In the next few days, you may receive an email or text message from [facility] [employer] with a link to a survey related to your  clinical pharmacist.\"     To schedule another MTM appointment, please call the clinic directly or you may call the MTM scheduling line at [phone].    My Clinical Pharmacist's contact information:                                                      Please feel free to contact me with any questions or concerns you have.        Stefanie Funk, PharmD     Medication Therapy Management (MTM) Pharmacist     [phone]     [email or web address]    "

## 2024-10-09 NOTE — PROGRESS NOTES
Medication Therapy Management (MTM) Encounter    ASSESSMENT:                            Medication Adherence/Access:  Patient seem to not remember some of his medications. Advised his to bring them next follow up    Diabetes Type II/weight loss: A1C above goal < 7%. Average fasting sugar has been above goal of 80 - 130. Considering higher BMI and higher sugar readings reasonable to increase zepbound dose. Patient has not put on his farida sensor yet; he plan to do it with the help of his daughter. Will continue to follow up closely.     Hypertension: Recent BP has been within goal, but mostly above goal. Advised patient to check his BP and report it to the writer.    Chronic Pain: Pain is not controlled, and it seems it is affecting his quality of life and sleep. Will ask PCP for pain referral.  Patient requested handicapped parking and disability evaluation; unsure how he can get it. Will communicate with PCP, and care coordinators.     Mental Health   Anxiety, Depression, and Insomnia: Due to extreme sleepiness and fatigue reasonable to discontinue olanzapine. Considering patient is not working, having to take care of his child, and with leg pain reasonable to consider applying for social security. However, this would need to be coordinated with PCP,  and care coordinator. Will communicate with PCP and care coordinators.     PLAN:                            Increase zepbound dose from 2.5 mg to 5 mg weekly   PharmD to refer patient to care coordinators and then possibly  to get help with disability evaluation and social security income  PharmD provided patient [employer] prescription program form to be filled   Discontinue taking olanzapine due to sleepiness    PharmD to order Uric acid levels and send patient to the lab for blood work  PharmD to communicate with PCP to refer him for his knee Pain  PharmD to communicate with PCP if he can be able to get handicapped parking     Follow-up: Due  "on 2024 @ 10 AM    SUBJECTIVE/OBJECTIVE:                          Jerson Lynn is a 59 year old male seen for an initial visit. He was referred to me from Dr. Frey. Name: (Garry  ID: In house interpretor)    Reason for visit: Medication Review and Diabetes Type II.    Allergies/ADRs: Reviewed in chart  Past Medical History: Reviewed in chart  Tobacco: He reports that he has never smoked. He has never used smokeless tobacco.  Alcohol: none  Medication Adherence/Access: Patient seem to not remember some of his medications. Advised to bring them next follow up    Diabetes  Type II:    Jardiance 25 mg daily    Zepbound 2.5 mg weekly    Glipizide XL 10 mg daily    Metformin 1000 mg BID   Aspirin 81mg daily  Patient noted he have some fogginess;he is having some delayed thinking.  Current diabetes symptoms: polyuria, polydipsia, polyphagia, fatigue, and numbness/tingling    Blood sugar monitorin time(s) week; he does check it twice weekly because of the lancets or test strips Ranges: (patient reported) Fastin, 165, and 169;patient noted his sugar has been high.  Post-Prandial: None  Diet/Exercise: Patient noted      Eye exam is up to date  Foot exam: Due    He got his farida sensor, but have not used it yet. He noted he will be taking it to his daughter to help him apply it on his arm and connect it with the billie.     There were no vitals filed for this visit.    Estimated body mass index is 38.11 kg/m  as calculated from the following:    Height as of 24: 5' 2.99\" (1.6 m).    Weight as of 24: 215 lb 1.6 oz (97.6 kg).    Lab Results   Component Value Date    A1C 8.4 2024     Results  CGM - Dexcom: Dexcom G7  and Sensors $0  CGM - Freestyle Farida: Farida 3 Thurmond and Sensors $0  DPP-4 - Januvia: $0  DPP-4 - Onglyza:   DPP-4 - Tradjenta:   GLP-1 - Adlyxin:   GLP-1 - Bydureon:   GLP-1 - Byetta:   GLP-1 - Mounjaro:   GLP-1 - Ozempic: $0  GLP-1 - Rybelsus:   GLP-1 - Trulicity: PA " Required  GLP-1 - Victoza: $0  Glucagon - Baqsimi:   Glucagon:     Soliqua:   Xultophy:   NPH Insulin - Humulin N:   NPH Insulin - Novolin N:   Rapid-acting Insulin - Admelog:   Rapid-acting Insulin - Apidra:   Rapid-acting Insulin - Fiasp:   Rapid-acting Insulin - Humalog:   Rapid-acting Insulin - Lyumjev:   Rapid-acting Insulin - Novolog:   SGLT-2 - Brenzavvy:   SGLT-2 - Farxiga:   SGLT-2 - Invokana:   SGLT-2 - Jardiance:   SGLT-2 - Steglatro:   Testing Supplies - AccuCheck:   Testing Supplies - Capri Contour:   Testing Supplies - OneTouch:   Weight Loss - Saxenda: $0 copay  Weight Loss - Wegovy: $0 copay  Weight Loss - Zepbound: $0 copay     Hypertension :    Hyzaar 100 - 25 mg daily   Patient reports no current medication side effects  Patient does not self-monitor blood pressure.  Patient asked for new BP cuff. Order sent to pharmacy.     BP Readings from Last 3 Encounters:   09/26/24 126/87   08/28/24 (!) 137/96   08/07/24 (!) 160/107     Chronic Pain:   Naproxen 500 mg twice daily    Gabapentin 300 mg BID    Cyclobenzaprine 10 mg twice daily as needed for muscle cramping   Patient noted this medication helps for his pain. Patient noted he has a lot of pain on his knees and, it has been hard for him to function well.     Mental Health   Anxiety, Depression, and Insomnia:    Fluoxetine 40 mg daily   Patient has been taking 20 mg and the dose was increased to 40 mg daily     Patient reports no current medication side effects.  Patient is wondering if these medication are causing him to gain weight     Previously, patient was active, but went through divorce and that has affected his physical and mental health. This has been going on for the last 10 years. Is not able to sleep due to his current situation.     - His [facility] doctor (Dr. Bella) said he is not improving from taking his medications. Noted his radha is not function as it should. He noted the olanzapine is making him sleep.   - Due to his  he has  one of his children with him who has disability, and this puts in a lot of pressure and stress on him, and is looking to see if he can quality for social security.    - Not able to work due to his mental and physical health. He easily gets agitated and irritated, and also he feels his mind is foggy. When he was working before, due to his stress levels, he used to argue with his boss and was not able to work effectively and safely.      Today's Vitals: There were no vitals taken for this visit.  ----------------      I spent 55 minutes with this patient today. All changes were made via collaborative practice agreement with Clemente Frey MD. A copy of the visit note was provided to the patient's provider(s).    A summary of these recommendations was given to the patient.     Medication Therapy Recommendations  Type 2 diabetes mellitus without complication, without long-term current use of insulin (H)    Current Medication: tirzepatide-Weight Management (ZEPBOUND) 2.5 MG/0.5ML prefilled pen (Discontinued)   Rationale: Dose too low - Dosage too low - Effectiveness   Recommendation: Increase Dose - Zepbound 5 MG/0.5ML Soaj   Status: Accepted per CPA              Stefanie Funk, PharmD     Medication Therapy Management (MTM) Pharmacist     [phone]     [email or web address]     [facility]

## 2024-10-10 LAB — URATE SERPL-MCNC: 7.4 MG/DL (ref 3.4–7)

## 2024-10-11 DIAGNOSIS — Z59.9 FINANCIAL DIFFICULTIES: Primary | ICD-10-CM

## 2024-10-11 SDOH — ECONOMIC STABILITY - INCOME SECURITY: PROBLEM RELATED TO HOUSING AND ECONOMIC CIRCUMSTANCES, UNSPECIFIED: Z59.9

## 2024-10-11 NOTE — ADDENDUM NOTE
Addended by: AMY TEMPLETON on: 10/11/2024 01:41 PM     Modules accepted: Orders     Impression: Presence of pseudophakia: Z96.1. Plan: Clear and centered IOL OD. S/p YAG Capsulotomy OD.

## 2024-10-15 ENCOUNTER — APPOINTMENT (OUTPATIENT)
Dept: INTERPRETER SERVICES | Facility: CLINIC | Age: 59
End: 2024-10-15
Payer: COMMERCIAL

## 2024-10-15 ENCOUNTER — PATIENT OUTREACH (OUTPATIENT)
Dept: CARE COORDINATION | Facility: CLINIC | Age: 59
End: 2024-10-15
Payer: COMMERCIAL

## 2024-10-15 NOTE — PROGRESS NOTES
Clinic Care Coordination Contact  Community Health Worker Initial Outreach    CHW Initial Information Gathering:  Referral Source: PCP  Preferred Urgent Care: [facility], [phone]  Current living arrangement:: I live in a private home with family  Type of residence:: Private home - stairs  Informal Support system:: Friends, Family  No PCP office visit in Past Year: No  Transportation means:: Regular car  CHW Additional Questions  If ED/Hospital discharge, follow-up appointment scheduled as recommended?: N/A  Medication changes made following ED/Hospital discharge?: N/A  MyChart active?: Yes  Patient sent Social Determinants of Health questionnaire?: Yes    Patient accepts CC: Yes. Patient scheduled for assessment with CC TINA Alatorre on Friday 10/18/2024 at 9:00 am. Patient noted desire to discuss recent CC referral placed by PCP.    CHW spoke to Jerson today via house  with [facility]  needed for phone assmt    Comments    - Due to his  he has one of his children with him who has disability, and this puts in a lot of pressure and stress on him, and is looking to see if he can quality for social security.    - Not able to work due to his mental and physical health. He easily gets agitated and irritated, and also he feels his mind is foggy. When he was working before, due to his stress levels, he used to argue with his boss and was not able to work effectively and safely.            Order Questions    Question Answer   Reason for Referral: SDOH Concern   Clinical Staff have discussed the Care Coordination Referral with the patient and/or caregiver: Yes   Additional Information: from Stefanie, patient agreed to referral     Vinita BOYLE  Community Health Worker  [facility]  Clinic Care Coordination  [facility] [email or web address]  Office: [phone]

## 2024-10-16 ENCOUNTER — TELEPHONE (OUTPATIENT)
Dept: INTERNAL MEDICINE | Facility: CLINIC | Age: 59
End: 2024-10-16
Payer: COMMERCIAL

## 2024-10-16 DIAGNOSIS — E11.9 TYPE 2 DIABETES MELLITUS WITHOUT COMPLICATION, WITHOUT LONG-TERM CURRENT USE OF INSULIN (H): Primary | ICD-10-CM

## 2024-10-16 NOTE — TELEPHONE ENCOUNTER
Patient's daughter called wondering why sensors have been falling off patient's arms.  Ordered Tegaderm IV adhesive to help secure the sensors.          Stefanie Funk, PharmD     Medication Therapy Management (MTM) Pharmacist     [phone]     [email or web address]      
Abd TTP diffusely.  Multiple scars Colostomy.

## 2024-10-17 ENCOUNTER — OFFICE VISIT (OUTPATIENT)
Dept: FAMILY MEDICINE | Facility: CLINIC | Age: 59
End: 2024-10-17
Payer: COMMERCIAL

## 2024-10-17 ENCOUNTER — ANCILLARY PROCEDURE (OUTPATIENT)
Dept: GENERAL RADIOLOGY | Facility: CLINIC | Age: 59
End: 2024-10-17
Attending: NURSE PRACTITIONER
Payer: COMMERCIAL

## 2024-10-17 VITALS
DIASTOLIC BLOOD PRESSURE: 88 MMHG | RESPIRATION RATE: 18 BRPM | TEMPERATURE: 98.4 F | SYSTOLIC BLOOD PRESSURE: 130 MMHG | WEIGHT: 210 LBS | HEIGHT: 63 IN | HEART RATE: 88 BPM | BODY MASS INDEX: 37.21 KG/M2 | OXYGEN SATURATION: 96 %

## 2024-10-17 DIAGNOSIS — M79.642 BILATERAL HAND PAIN: ICD-10-CM

## 2024-10-17 DIAGNOSIS — M25.562 CHRONIC PAIN OF BOTH KNEES: Primary | ICD-10-CM

## 2024-10-17 DIAGNOSIS — M79.641 BILATERAL HAND PAIN: ICD-10-CM

## 2024-10-17 DIAGNOSIS — F33.1 MODERATE EPISODE OF RECURRENT MAJOR DEPRESSIVE DISORDER (H): ICD-10-CM

## 2024-10-17 DIAGNOSIS — G89.29 CHRONIC PAIN OF BOTH KNEES: Primary | ICD-10-CM

## 2024-10-17 DIAGNOSIS — M10.049 ACUTE IDIOPATHIC GOUT OF HAND, UNSPECIFIED LATERALITY: ICD-10-CM

## 2024-10-17 DIAGNOSIS — M25.561 CHRONIC PAIN OF BOTH KNEES: Primary | ICD-10-CM

## 2024-10-17 PROCEDURE — 73130 X-RAY EXAM OF HAND: CPT | Mod: TC | Performed by: RADIOLOGY

## 2024-10-17 PROCEDURE — G2211 COMPLEX E/M VISIT ADD ON: HCPCS | Performed by: NURSE PRACTITIONER

## 2024-10-17 PROCEDURE — 96127 BRIEF EMOTIONAL/BEHAV ASSMT: CPT | Performed by: NURSE PRACTITIONER

## 2024-10-17 PROCEDURE — 99215 OFFICE O/P EST HI 40 MIN: CPT | Performed by: NURSE PRACTITIONER

## 2024-10-17 RX ORDER — COLCHICINE 0.6 MG/1
0.6 TABLET ORAL DAILY PRN
Qty: 30 TABLET | Refills: 0 | Status: SHIPPED | OUTPATIENT
Start: 2024-10-17

## 2024-10-17 ASSESSMENT — PATIENT HEALTH QUESTIONNAIRE - PHQ9
SUM OF ALL RESPONSES TO PHQ QUESTIONS 1-9: 23
10. IF YOU CHECKED OFF ANY PROBLEMS, HOW DIFFICULT HAVE THESE PROBLEMS MADE IT FOR YOU TO DO YOUR WORK, TAKE CARE OF THINGS AT HOME, OR GET ALONG WITH OTHER PEOPLE: VERY DIFFICULT
SUM OF ALL RESPONSES TO PHQ QUESTIONS 1-9: 23

## 2024-10-17 NOTE — PROGRESS NOTES
"  Assessment & Plan     Chronic pain of both knees  Follow up with ortho to discuss treatment options.  May benefit from injections.  Patient has order from PCP to obtain xray of knees.     - Orthopedic  Referral; Future    Acute idiopathic gout of hand, unspecified laterality  Uric acid is elevated-this could represent gout and be contributing to hand swelling and symptoms.  There is no 1 specific joint currently that has bothersome.  No redness or warmth however patient does endorse at times there is warmth on the hands.    I have sent in colchicine to take daily as needed for up to a week at a time.  Discussed this is not meant to be long-term.    - colchicine (COLCRYS) 0.6 MG tablet; Take 1 tablet (0.6 mg) by mouth daily as needed for gout pain.    Bilateral hand pain  X-ray obtained of hands.  There is diffuse swelling throughout.  Of note he did have recent normal lab work that was looking into autoimmune causes of joint pain.    - XR Hand Bilateral G/E 3 Views; Future    Moderate episode of recurrent major depressive disorder (H)  Patient endorses significant suicidal ideation but not an active plan or any intent to kill himself. We did discuss follow up with therapy.  I encouraged ER visit, 911 call for [phone] call if worsening thoughts or any intent.  Patient does feel safe to go home currently.    Reviewed documentation from his pharmacy visit that implied he was still taking his fluoxetine however the olanzapine was stopped.  Discussed with patient since it is very unclear if he is actually taking fluoxetine and as he is not sure I am hesitant to change his depression medication.  He does have a follow-up with his primary in about 2 weeks.  I encouraged patient to take all of his medications to this visit and discuss changes to medications.          BMI  Estimated body mass index is 37.2 kg/m  as calculated from the following:    Height as of this encounter: 1.6 m (5' 3\").    Weight as of this " encounter: 95.3 kg (210 lb).       Depression Screening Follow Up        10/17/2024    10:14 AM   PHQ   PHQ-9 Total Score 23   Q9: Thoughts of better off dead/self-harm past 2 weeks Nearly every day   F/U: Thoughts of suicide or self-harm No   F/U: Safety concerns No                     Follow Up Actions Taken  Crisis resource information provided in the After Visit Summary  Referred patient back to mental health provider  Patient to follow up with PCP.  Clinic staff to schedule appointment if able.    Discussed the following ways the patient can remain in a safe environment:        I spent a total of 40 minutes on the day of the visit.   Time spent by me doing chart review, history and exam, documentation and further activities per the note     The longitudinal plan of care for the diagnosis(es)/condition(s) as documented were addressed during this visit. Due to the added complexity in care, I will continue to support Jerson in the subsequent management and with ongoing continuity of care.       Fariha Arthur is a 59 year old, presenting for the following health issues:  Hand Injury (Fingers and knees hurt. Pt says it's hard to walk )      10/17/2024    10:30 AM   Additional Questions   Roomed by ANN SMITH     History of Present Illness       Reason for visit:  Finger pain  Symptom onset:  More than a month  Symptom intensity:  Moderate  Symptom progression:  Improving  Had these symptoms before:  No  What makes it worse:  No  What makes it better:  No   He is taking medications regularly.       Patient has previously seen Dr. Schneider regarding chronic pain and pain in hands. Patient has had lab work up that was unremarkable     Patient reports sometimes will hurt and then go away and will sometimes get better. Pain hurts the most when walking. Does take tylenol/ibuprofen-helps with the knees but not the hands.     Recommended to take tylenol as needed when seen by Dr. schneider.     Has been doing PT for knees-seems like  "after therapy pain will come back.     Wondering if he is having gout in hands. Had uric acid drawn on 10-9 and was elevated minimally.     Ongoing depression symptoms. Unclear whether patient is currently taking fluoxetine. Patient reports he was prescribed switched from fluoxetine to olanzapine and he felt the olanzapine was making him drowsy. He has stopped olanzapine but unclear whether taking fluoxetine.    When wakes up at night cannot sleep due to pain. Last night only slept 2-3 hours. Patient reports wife -has had family stress, one of his sons was \"institutionalized.\"     At times well see people die and wonder why is it not me. NO active plan to hurt/kill self. Sometimes feels like brain is not working. Lots of times feels like he should take pills and overdose on them. Does not currently have active plan-has not previously attmped as he reports he has to be around for his kids. Reports when he is by himself things are ok-reports when he is around others symptoms are worsening. His daughter has been helpful.     Patient has been seeing therapy through [facility]has not been recently- just received medical insurance again after not working.                     Objective    /88 (BP Location: Left arm, Patient Position: Sitting, Cuff Size: Adult Regular)   Pulse 88   Temp 98.4  F (36.9  C) (Oral)   Resp 18   Ht 1.6 m (5' 3\")   Wt 95.3 kg (210 lb)   SpO2 96%   BMI 37.20 kg/m    Body mass index is 37.2 kg/m .  Physical Exam  Constitutional:       Appearance: Normal appearance.   Musculoskeletal:      Right hand: Swelling present. Normal range of motion.      Left hand: Swelling present. Normal range of motion.      Right knee: Swelling present.      Left knee: Swelling present.   Neurological:      Mental Status: He is alert.   Psychiatric:         Attention and Perception: Attention normal.         Mood and Affect: Mood is depressed.         Speech: Speech normal.         Behavior: " Behavior normal.         Thought Content: Thought content normal.                  Signed Electronically by: TAMMIE OTT CNP

## 2024-10-18 ENCOUNTER — PATIENT OUTREACH (OUTPATIENT)
Dept: NURSING | Facility: CLINIC | Age: 59
End: 2024-10-18
Payer: COMMERCIAL

## 2024-10-18 ASSESSMENT — ACTIVITIES OF DAILY LIVING (ADL): DEPENDENT_IADLS:: INDEPENDENT
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
## 2024-10-18 NOTE — LETTER
Nyob zoo Lue,    Kuv yog ib tugtxhim tsa magalys saib xyuas ntawm qhov chaw arianna mob uas ua hauj lwm nrog Dr Tk hall [facility].Kuv jose ntawv tuaj qhia koj paub txog peb txoj kika num uas yog txhim tsa magalys saib xyuas krissy hauv qhov chaw arianna mob thiab peb tseem pab txhawb nqa koj tau kom ncav cuag koj iain bel phiaj ntawm magalys arianna mob.    Peb pab pawg txhim tsa magalys saib xyuas krissy qhov chaw arianna mob muaj neeg xws li ib tug nais maum txawj, ib tug kws pab pej xeem, thiab ib tug neeg arianna mob hauv zej zog. Lub bel phiaj krissy peb pab pawg no yog pab koj noj qab nyob zoo thiab pab kom thiaj yooj samara krissy koj txais tau magalys arianna mob. Peb pab pawg yuav pab koj ncav cuag koj iain bel phiaj ntawm magalys arianna mob dhau ntawm muab ntaub ntawv ntawm magalys noj qab nyob zoo krissy koj, txhim tsa magalys pab, ntxiv zog krissy magalys tiv tauj ntawm koj thiab koj tus kws arianna mob, thiab txhawb nqa koj yog koj yuav tsum muaj dab tsi.    Thov tiv tauj tuaj krissy Celi kyle [phone] yog koj muaj devante nug los sis magalys txhawj xeeb dab tsi. Peb ua tib zoo siv zog muab magalys arianna mob zoo tshaj plaws krissy koj. Peb xav pab koj ncav cuag thiab ua neej nyob raws li koj iain bel phiaj ntawm magalys arianna mob.    Celi Altamirano,   [facility]  [phone]      Dear Lue,    I am a clinic care coordinator who works with Dr Frey at [facility]. I'm writing to tell you about clinic care coordination, and how we may be able to support you in achieving your health-related goals.    Our clinic care coordination team consists of a registered nurse, , and community health worker. The goal of clinic care coordination is to help you manage your health and to improve your access to the health care system in an efficient way. Our team can help you meet your health care goals by providing you with health information, coordinating services, strengthening the communication among your providers, and supporting you with any resource needs.    Please  feel free to contact Celi at [phone] with any questions or concerns. We are focused on providing you with the highest-quality health care experience possible. We want to help you achieve and maintain your health goals.    Sincerely,     C[facility] Hernandez,   [facility]  [phone]

## 2024-10-18 NOTE — LETTER
[facility]  Patient Centered Plan of Care  About Me:        Patient Name:  Jerson Lynn    YOB: 1965  Age:         59 year old   [place] MRN:    [id number] Telephone Information:  Home Phone [phone]   Mobile [phone]       Address:  [address] [place] [address] Email address:  [email or web address]      Emergency Contact(s)    Name Relationship Lgl Grd Work Phone Home Phone Mobile Phone           Primary language:  Hmong     needed? Yes   [place] [facility]:  [phone] op. 1  Other communication barriers:Language barrier; Physical impairment    Preferred Method of Communication:     Current living arrangement: I live in a private home with family (with son, his wife and their kids)    Mobility Status/ Medical Equipment: Independent        Health Maintenance  Health Maintenance Reviewed: Due/Overdue   Health Maintenance Due   Topic Date Due    DIABETIC FOOT EXAM  Never done    ADVANCE CARE PLANNING  Never done    DEPRESSION ACTION PLAN  Never done    COLORECTAL CANCER SCREENING  Never done    HIV SCREENING  Never done    HEPATITIS C SCREENING  Never done    HEPATITIS B IMMUNIZATION (1 of 3 - 19+ 3-dose series) Never done    DTAP/TDAP/TD IMMUNIZATION (3 - Tdap) 11/21/1987    ZOSTER IMMUNIZATION (1 of 2) Never done    INFLUENZA VACCINE (1) 09/01/2024    COVID-19 Vaccine (5 - 2024-25 season) 09/01/2024           My Access Plan  Medical Emergency 911   Primary Clinic Line [facility] - [phone]   24 Hour Appointment Line [phone] or  [phone]) (toll-free)   24 Hour Nurse Line [phone] (toll-free)   Preferred Urgent Care [facility], [phone]     Preferred Hospital [facility]  [phone]     Preferred Pharmacy [facility] - [place], [place] - [address]     Behavioral Health Crisis Line The National Suicide Prevention Lifeline at 1-636.683.8197 or  Text/Call 988           My Care Team Members  Patient Care Team         Relationship Specialty Notifications Start End    Clemente Frey MD PCP - General Internal Medicine  8/7/24     Phone: [phone] Pager: [phone] Fax: [phone]        [address] [place] [address]    Clemente Frey MD Assigned PCP   8/23/24     Phone: [phone] Pager: [phone] Fax: [phone]        [address] [place] [address]    Stefanie Funk [facility] Pharmacist   9/9/24     Phone: [phone] Fax: [phone]         [address] [place] [address]    Stefanie Funk [facility] Assigned [place] Pharmacist   9/23/24     Phone: [phone] Fax: [phone]         [address] [place] [address]    Celi Hernandez LSW Lead Care Coordinator Primary Care - CC Admissions 10/15/24     Phone: [phone]         Benita Lo CHW Community Health Worker  Admissions 10/18/24                 My Care Plans  Self Management and Treatment Plan    Care Plan      Advance Care Plans/Directives:   Advanced Care Plan/Directives on file:   No    Discussed with patient/caregiver(s):   Declined Further Information             My Medical and Care Information  Problem List   Patient Active Problem List   Diagnosis    Type 2 diabetes mellitus without complication, without long-term current use of insulin (H)    Class 2 severe obesity due to excess calories with serious comorbidity in adult (H)    Mixed hyperlipidemia    Primary hypertension    Acquired hypothyroidism    Moderate episode of recurrent major depressive disorder (H)    Chronic pain of both knees      Current Medications and Allergies:    Current Outpatient Medications   Medication Instructions    aspirin 81 MG EC tablet TAKE 1 PILL BY MOUTH EVERY DAY / TXHUA HNUB NOJ 1 LUB TSHUAJ PAB KOM NTSHAV KHIAV ZOO    atorvastatin (LIPITOR) 40 MG tablet TAKE 1 PILL BY MOUTH EVERY NIGHT AT BEDTIME FOR HIGH CHOLESTEROL / TXHUA  "HMO NOJ [facility]UB [facility] PW PAB NTSHAV MUAJ ROJ    Blood Pressure Monitoring (COMFORT TOUCH BP CUFF/MEDIUM) MISC 1 each, Does not apply, DAILY    colchicine (COLCRYS) 0.6 mg, Oral, DAILY PRN    Continuous Glucose Sensor (FREESTYLE ALEC 3 PLUS SENSOR) MISC Change every 15 days.    Continuous Glucose Sensor (FREESTYLE ALEC 3 SENSOR) MISC Change every 14 days.    cyclobenzaprine (FLEXERIL) 10 MG tablet Take 1 tablet twice a day by oral route as needed for 15 days, for muscle cramping.    empagliflozin (JARDIANCE) 25 MG TABS tablet     FLUoxetine (PROZAC) 40 mg, Oral, DAILY    gabapentin (NEURONTIN) 300 MG capsule Take 1 capsule twice a day by oral route as needed for 30 days, for nerve pain.    glipiZIDE (GLUCOTROL XL) 10 mg, Oral, 2 TIMES DAILY    levothyroxine (SYNTHROID/LEVOTHROID) 150 MCG tablet TAKE 1 PILL BY MOUTH EVERY DAY FOR HYPOTHYROIDISM/ NOJ IB LUB IB HNUB PAB MADELINE LUB THYROID    losartan-hydrochlorothiazide (HYZAAR) 100-25 MG tablet TAKE 1 PILL BY MOUTH DAILY FOR BLOOD PRESSURE / TXHUA HNUB NOJ 1 LUB TSHUA PAB ZOO MADELINE NTSHAV SIAB    metFORMIN (GLUCOPHAGE) 1000 MG tablet TAKE 1 PILL BY MOUTH TWO TIMES A DAY WITH MORNING AND EVENING MEAL/ TXHUA HNUB NOJ 1 LUB 2 ZAUG NROG TSHIAS THIAB NROG O PAB ZOO NTSV QAB ZIB    naproxen (NAPROSYN) 500 MG tablet TAKE 1 TABLET BY MOUTH TWICE A DAY AS NEEDED FOR BILATERAL LEG PAIN FOR 15 DAYS    tirzepatide-Weight Management (ZEPBOUND) 5 mg, Subcutaneous, EVERY 7 DAYS    tirzepatide-Weight Management (ZEPBOUND) 7.5 mg, Subcutaneous, EVERY 7 DAYS    Transparent Dressings (TEGADERM I.V. 2\"X2-1/4\") MISC 1 each, Does not apply, EVERY 14 DAYS         Care Coordination Start Date: 10/11/2024   Frequency of Care Coordination: monthly, more frequently as needed     Form Last Updated: 10/18/2024       "
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
## 2024-10-22 ENCOUNTER — OFFICE VISIT (OUTPATIENT)
Dept: ORTHOPEDICS | Facility: CLINIC | Age: 59
End: 2024-10-22
Attending: NURSE PRACTITIONER
Payer: COMMERCIAL

## 2024-10-22 ENCOUNTER — ANCILLARY PROCEDURE (OUTPATIENT)
Dept: GENERAL RADIOLOGY | Facility: CLINIC | Age: 59
End: 2024-10-22
Attending: STUDENT IN AN ORGANIZED HEALTH CARE EDUCATION/TRAINING PROGRAM
Payer: COMMERCIAL

## 2024-10-22 VITALS — SYSTOLIC BLOOD PRESSURE: 118 MMHG | DIASTOLIC BLOOD PRESSURE: 82 MMHG

## 2024-10-22 DIAGNOSIS — M25.561 CHRONIC PAIN OF BOTH KNEES: ICD-10-CM

## 2024-10-22 DIAGNOSIS — M25.362 KNEE INSTABILITY, LEFT: ICD-10-CM

## 2024-10-22 DIAGNOSIS — G89.29 CHRONIC PAIN OF BOTH KNEES: ICD-10-CM

## 2024-10-22 DIAGNOSIS — M25.562 CHRONIC PAIN OF BOTH KNEES: ICD-10-CM

## 2024-10-22 DIAGNOSIS — M17.0 BILATERAL PRIMARY OSTEOARTHRITIS OF KNEE: Primary | ICD-10-CM

## 2024-10-22 PROCEDURE — 99204 OFFICE O/P NEW MOD 45 MIN: CPT | Performed by: STUDENT IN AN ORGANIZED HEALTH CARE EDUCATION/TRAINING PROGRAM

## 2024-10-22 PROCEDURE — 73562 X-RAY EXAM OF KNEE 3: CPT | Mod: TC | Performed by: RADIOLOGY

## 2024-10-22 NOTE — PATIENT INSTRUCTIONS
Jerson Lynn, It was nice to see you in our office today.      DIAGNOSIS:   1. Bilateral primary osteoarthritis of knee    2. Chronic pain of both knees    3. Knee instability, left        INSTRUCTIONS FOR FOLLOW-UP CARE:  Activity Status Recommended:Activity as tolerated, being guided by pain as follows:   Traffic Light System :  Green Light (0-3/10 pain): No increases in symptoms, you are OK to continue the activity, or perhaps increase load slightly.  Yellow light (4-6/10 pain): Minor increase in symptoms, but you can move normally within an hour of exercise, and pain reduces to normal within the next 24 hours. Proceed with caution, you can do minor bouts of loading, but too much will aggravate your symptoms and increase pain.  Red light (7-10/10 pain): Cease activity, as you have exceeded your tolerance. Generally, involves a significant increase in pain, which may not settle in the following 24 hours. Rest for 24-48 hours, allow symptoms to settle down, then begin gentle movement, and monitor your progression.  Imaging ordered: Bilateral knee radiographs obtained and reviewed at this visit  Rehabilitation: Continue physical therapy  Bracing/Orthotics: Left medial offloader/osteoarthritis brace recommended  US guided injections: Will schedule you for bilateral knee Synvisc/gel injections under ultrasound guidance.  We will consider corticosteroid injections once your Hemoglobin A1C is below 8.  Medications: Voltaren Gel recommended up to 4 times daily      Follow up: Next available for knee Hyaluronic Acid/Gel-injection      CLINIC LOCATIONS:     [place] [address], [address] TRIAGE LINE: [phone]   [place] [address] APPOINTMENTS: [phone]   (Monday & Friday) RADIOLOGY: [phone]    MRI/CT [phone]   [facility]: [phone] [id number] BILLING QUESTIONS: [phone]   [place] [address] FAX: [phone]   (Tuesday & Wednesday)         Thank you for choosing [facility]!    If you have any questions, please do not hesitate to reach out on [employer] or Call [phone] and ask for my team.    Corrie Castellon MD, [facility]
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
## 2024-10-30 ENCOUNTER — OFFICE VISIT (OUTPATIENT)
Dept: INTERNAL MEDICINE | Facility: CLINIC | Age: 59
End: 2024-10-30
Payer: COMMERCIAL

## 2024-10-30 VITALS
BODY MASS INDEX: 37.16 KG/M2 | SYSTOLIC BLOOD PRESSURE: 135 MMHG | WEIGHT: 209.7 LBS | HEIGHT: 63 IN | RESPIRATION RATE: 18 BRPM | HEART RATE: 76 BPM | TEMPERATURE: 97.8 F | DIASTOLIC BLOOD PRESSURE: 88 MMHG | OXYGEN SATURATION: 98 %

## 2024-10-30 DIAGNOSIS — F33.1 MODERATE EPISODE OF RECURRENT MAJOR DEPRESSIVE DISORDER (H): Primary | ICD-10-CM

## 2024-10-30 DIAGNOSIS — M25.561 CHRONIC PAIN OF BOTH KNEES: ICD-10-CM

## 2024-10-30 DIAGNOSIS — E11.9 TYPE 2 DIABETES MELLITUS WITHOUT COMPLICATION, WITHOUT LONG-TERM CURRENT USE OF INSULIN (H): ICD-10-CM

## 2024-10-30 DIAGNOSIS — M25.562 CHRONIC PAIN OF BOTH KNEES: ICD-10-CM

## 2024-10-30 DIAGNOSIS — Z11.4 SCREENING FOR HIV (HUMAN IMMUNODEFICIENCY VIRUS): ICD-10-CM

## 2024-10-30 DIAGNOSIS — G89.29 CHRONIC PAIN OF BOTH KNEES: ICD-10-CM

## 2024-10-30 DIAGNOSIS — Z11.59 NEED FOR HEPATITIS C SCREENING TEST: ICD-10-CM

## 2024-10-30 LAB
EST. AVERAGE GLUCOSE BLD GHB EST-MCNC: 160 MG/DL
HBA1C MFR BLD: 7.2 % (ref 0–5.6)

## 2024-10-30 PROCEDURE — 99214 OFFICE O/P EST MOD 30 MIN: CPT | Mod: 25 | Performed by: INTERNAL MEDICINE

## 2024-10-30 PROCEDURE — 91320 SARSCV2 VAC 30MCG TRS-SUC IM: CPT | Performed by: INTERNAL MEDICINE

## 2024-10-30 PROCEDURE — 36415 COLL VENOUS BLD VENIPUNCTURE: CPT | Performed by: INTERNAL MEDICINE

## 2024-10-30 PROCEDURE — 86803 HEPATITIS C AB TEST: CPT | Performed by: INTERNAL MEDICINE

## 2024-10-30 PROCEDURE — 83036 HEMOGLOBIN GLYCOSYLATED A1C: CPT | Performed by: INTERNAL MEDICINE

## 2024-10-30 PROCEDURE — 87389 HIV-1 AG W/HIV-1&-2 AB AG IA: CPT | Performed by: INTERNAL MEDICINE

## 2024-10-30 PROCEDURE — 90471 IMMUNIZATION ADMIN: CPT | Performed by: INTERNAL MEDICINE

## 2024-10-30 PROCEDURE — 90673 RIV3 VACCINE NO PRESERV IM: CPT | Performed by: INTERNAL MEDICINE

## 2024-10-30 PROCEDURE — 90480 ADMN SARSCOV2 VAC 1/ONLY CMP: CPT | Performed by: INTERNAL MEDICINE

## 2024-10-30 ASSESSMENT — PAIN SCALES - GENERAL: PAINLEVEL_OUTOF10: MODERATE PAIN (4)

## 2024-10-30 ASSESSMENT — ANXIETY QUESTIONNAIRES
7. FEELING AFRAID AS IF SOMETHING AWFUL MIGHT HAPPEN: MORE THAN HALF THE DAYS
3. WORRYING TOO MUCH ABOUT DIFFERENT THINGS: NEARLY EVERY DAY
GAD7 TOTAL SCORE: 16
1. FEELING NERVOUS, ANXIOUS, OR ON EDGE: NEARLY EVERY DAY
2. NOT BEING ABLE TO STOP OR CONTROL WORRYING: SEVERAL DAYS
GAD7 TOTAL SCORE: 16
6. BECOMING EASILY ANNOYED OR IRRITABLE: NEARLY EVERY DAY
IF YOU CHECKED OFF ANY PROBLEMS ON THIS QUESTIONNAIRE, HOW DIFFICULT HAVE THESE PROBLEMS MADE IT FOR YOU TO DO YOUR WORK, TAKE CARE OF THINGS AT HOME, OR GET ALONG WITH OTHER PEOPLE: VERY DIFFICULT
5. BEING SO RESTLESS THAT IT IS HARD TO SIT STILL: MORE THAN HALF THE DAYS

## 2024-10-30 ASSESSMENT — PATIENT HEALTH QUESTIONNAIRE - PHQ9
5. POOR APPETITE OR OVEREATING: MORE THAN HALF THE DAYS
SUM OF ALL RESPONSES TO PHQ QUESTIONS 1-9: 19

## 2024-10-30 NOTE — NURSING NOTE
Patient tolerated Covid and Flu injection without incident. Injection sites free from redness, swelling, and burning. Patient discharged in the care of lab. Patient aware of next appointment.

## 2024-10-30 NOTE — PROGRESS NOTES
"  Assessment & Plan     (F33.1) Moderate episode of recurrent major depressive disorder (H)  (primary encounter diagnosis)  Comment: long history of family difficulties, only scraped surface on complexity today. On 40mg citalopram.  Plan: Adult Mental Health  Referral        He is connected with our care coordination team, would also like for him to see [facility] for additional input in his regimen given this complexity, rule out other mental health disorders. Will see him again in 4 weeks regardless, virtually.    (E11.9) Type 2 diabetes mellitus without complication, without long-term current use of insulin (H)  Comment: his CGM falls off at times so has struggled to provide much data for me today. He is currently on 5mg Zepbound  Plan: HEMOGLOBIN A1C        Will keep dose the same for now. Has follow up with MTM in the coming 2 weeks, hopefully will have more data at that time. A1c will be due then as well.  Eye exam up to date    (M25.561,  M25.562,  G89.29) Chronic pain of both knees  Comment: sees sports medicine, it looks like they recommended voltaren gel though it was not sent through  Plan: diclofenac (VOLTAREN) 1 % topical gel        Ordered. Follow up with their team was recommended.    Covid and flu shots today        BMI  Estimated body mass index is 37.15 kg/m  as calculated from the following:    Height as of this encounter: 1.6 m (5' 3\").    Weight as of this encounter: 95.1 kg (209 lb 11.2 oz).       Depression Screening Follow Up        10/30/2024     4:09 PM   PHQ   PHQ-9 Total Score 19   Q9: Thoughts of better off dead/self-harm past 2 weeks Nearly every day                     Follow Up Actions Taken  Mental Health Referral placed    Discussed the following ways the patient can remain in a safe environment:  be around others        Fariha Arthur is a 59 year old, presenting for the following health issues:  Diabetes, Thyroid Problem, office visit, and Recheck Medication (Pt reports that " "he's here to follow up on his diabetes and thyroid issue. Pt also reports that left hand hurt to bend at times since March of 2024.)      10/30/2024     3:42 PM   Additional Questions   Roomed by estefany   Accompanied by alone         10/30/2024     3:42 PM   Patient Reported Additional Medications   Patient reports taking the following new medications none     HPI                   Objective    BP (!) 142/93 (BP Location: Left arm, Patient Position: Sitting, Cuff Size: Adult Large)   Pulse 76   Temp 97.8  F (36.6  C) (Axillary)   Resp 18   Ht 1.6 m (5' 3\")   Wt 95.1 kg (209 lb 11.2 oz)   SpO2 98%   BMI 37.15 kg/m    Body mass index is 37.15 kg/m .  Physical Exam               Signed Electronically by: Clemente Frey MD    "

## 2024-11-01 LAB
HCV AB SERPL QL IA: NONREACTIVE
HIV 1+2 AB+HIV1 P24 AG SERPL QL IA: NONREACTIVE

## 2024-11-08 ENCOUNTER — OFFICE VISIT (OUTPATIENT)
Dept: ORTHOPEDICS | Facility: CLINIC | Age: 59
End: 2024-11-08
Payer: COMMERCIAL

## 2024-11-08 DIAGNOSIS — G89.29 CHRONIC PAIN OF BOTH KNEES: ICD-10-CM

## 2024-11-08 DIAGNOSIS — M25.561 CHRONIC PAIN OF BOTH KNEES: ICD-10-CM

## 2024-11-08 DIAGNOSIS — M17.0 BILATERAL PRIMARY OSTEOARTHRITIS OF KNEE: Primary | ICD-10-CM

## 2024-11-08 DIAGNOSIS — M25.562 CHRONIC PAIN OF BOTH KNEES: ICD-10-CM

## 2024-11-08 PROCEDURE — 20611 DRAIN/INJ JOINT/BURSA W/US: CPT | Mod: 50 | Performed by: STUDENT IN AN ORGANIZED HEALTH CARE EDUCATION/TRAINING PROGRAM

## 2024-11-08 PROCEDURE — T1013 SIGN LANG/ORAL INTERPRETER: HCPCS | Performed by: INTERPRETER

## 2024-11-08 RX ORDER — TRIAMCINOLONE ACETONIDE 40 MG/ML
40 INJECTION, SUSPENSION INTRA-ARTICULAR; INTRAMUSCULAR
Status: COMPLETED | OUTPATIENT
Start: 2024-11-08 | End: 2024-11-08

## 2024-11-08 RX ORDER — LIDOCAINE HYDROCHLORIDE 10 MG/ML
2.5 INJECTION, SOLUTION INFILTRATION; PERINEURAL
Status: COMPLETED | OUTPATIENT
Start: 2024-11-08 | End: 2024-11-08

## 2024-11-08 RX ADMIN — TRIAMCINOLONE ACETONIDE 40 MG: 40 INJECTION, SUSPENSION INTRA-ARTICULAR; INTRAMUSCULAR at 14:18

## 2024-11-08 RX ADMIN — LIDOCAINE HYDROCHLORIDE 2.5 ML: 10 INJECTION, SOLUTION INFILTRATION; PERINEURAL at 14:18

## 2024-11-08 NOTE — LETTER
11/8/2024      Jerson Lynn  [address] [place] [address]      Dear Colleague,    Thank you for referring your patient, Jerson Lynn, to the [facility] [place]. Please see a copy of my visit note below.    SPORTS MEDICINE CLINIC PROCEDURE VISIT    Referral Source: Clemente Frey MD    Pre-Procedure Diagnosis: Primary Osteoarthritis of bilateral Knee  Post-Procedure Diagnosis: Primary Osteoarthritis of bilateral Knee    Procedure: US-guided bilateral  knee corticosteroid injection  Medications and allergies were reviewed with the patient. No contraindications were identified.    Informed Consent:   Following denial of allergy and review of potential side effects and complications including but not necessarily limited to infection, allergic reaction, local tissue breakdown, systemic effects of corticosteroids, elevation of blood glucose, injury to soft tissue and/or nerves and seizure, the patient indicated understanding and agreed to proceed. Written consent was obtained.    Procedural Details  The use of direct ultrasound visualization of the needle (rather than a non-guided injection) was required to increase patient safety by excluding inadvertent intramuscular or intratendinous placement and minimizing bleeding by avoiding osteochondral or vascular injury from the needle.  Additionally, the increased accuracy of placement may increase clinical effectiveness and will allow higher diagnostic specificity when evaluating effectiveness of this injection.  Using ultrasound, a pre-scan of the region was performed to identify the target structure.     The area was prepped with chloraprep, then re-examined using the same transducer, a sterile ultrasound transducer cover, and sterile ultrasound gel.    Procedural pause conducted to verify: Correct patient identity, procedure to be performed, and as applicable, correct side and site, correct patient position, and availability of implants,  special equipment, or special requirements.    Procedure: Right knee  Transducer: 4-12 MHz Linear   Patient position: Supine with the knee bent to 30 degrees.  Localization process: The suprapatellar recess was localized in a short axis view.  Local anesthesia: Local anesthesia was obtained with 3 cc of 1% lidocaine.  Needle: A 25-gauge, 1.5-inch needle was used for local anesthesia and injectate.  Approach: A lateral to medial, in plane, approach was used to guide the needle tip into the suprapatellar recess deep to the quadriceps tendon and superficial to the prefemoral fat pad.   Injection: A mixture of 4 cc of normal saline and 1 cc of triamcinolone 40mg/ml was injected into the right knee joint without complication.    Procedure: Left  knee  Transducer: 4-12 MHz Linear   Patient position: Supine with the knee bent to 30 degrees.  Localization process: The suprapatellar recess was localized in a short axis view.  Local anesthesia: Local anesthesia was obtained with 3 cc of 1% lidocaine.  Needle: A 25-gauge 1.5 inch needle was used for the local anesthesia and injectate.  Approach: A lateral to medial, in plane, approach was used to guide the needle tip into the suprapatellar recess deep to the quadriceps tendon and superficial to the prefemoral fat pad.   Injection: A mixture of 4 cc of normal saline and 1 cc of triamcinolone 40mg/ml was injected into the left knee joint without complication.    Post-procedural care: The patient tolerated the procedure well. He reported excellent pain relief during the anesthetic phase.     Pre-procedural pain level:  Right knee: 4 to 5/10  Left knee: 7 to 8/10    Post-procedural pain level:  Right knee: 2 to 3/10  Left knee: 4 to 5/10    The patient was asked to ice for improved pain control and avoid submerging the area in water for the next 48 hours to help reduce the risk of infection. The patient was instructed to call the office immediately if there are any questions or  concerns. He will plan to follow up in 4 weeks for Synvisc One.    Corrie Castellon MD, [facility]  Sports Medicine Attending Physician  Department of Physical Medicine & Rehabilitation       Large Joint Injection/Arthocentesis: bilateral knee    Date/Time: 11/8/2024 2:18 PM    Performed by: Corrie Castellon MD  Authorized by: Corrie Castellon MD    Indications:  Pain and osteoarthritis  Needle Size:  25 G  Guidance: ultrasound    Approach:  Anterolateral  Location:  Knee  Laterality:  Bilateral      Medications (Right):  40 mg triamcinolone 40 MG/ML; 2.5 mL lidocaine 1 %   Medications (Right) comment:  Sodium chloride (PF) 0.9% PF, 4 mL, Lot: [id number], Exp: 04/30/2025  Medications (Left):  40 mg triamcinolone 40 MG/ML; 2.5 mL lidocaine 1 %   Medications (Left) comment:  Sodium chloride (PF) 0.9% PF, 4 mL, Lot: [id number], Exp: 04/30/2025  Outcome:  Tolerated well, no immediate complications  Procedure discussed: discussed risks, benefits, and alternatives    Consent Given by:  Patient  Timeout: timeout called immediately prior to procedure    Prep: patient was prepped and draped in usual sterile fashion     Ultrasound was used to ensure safe and accurate needle placement and injection. Ultrasound images of the procedure were permanently stored.          Again, thank you for allowing me to participate in the care of your patient.        Sincerely,        Corrie Castellon MD

## 2024-11-08 NOTE — PROGRESS NOTES
SPORTS MEDICINE CLINIC PROCEDURE VISIT    Referral Source: Clemente Frey MD    Pre-Procedure Diagnosis: Primary Osteoarthritis of bilateral Knee  Post-Procedure Diagnosis: Primary Osteoarthritis of bilateral Knee    Procedure: US-guided bilateral  knee corticosteroid injection  Medications and allergies were reviewed with the patient. No contraindications were identified.    Informed Consent:   Following denial of allergy and review of potential side effects and complications including but not necessarily limited to infection, allergic reaction, local tissue breakdown, systemic effects of corticosteroids, elevation of blood glucose, injury to soft tissue and/or nerves and seizure, the patient indicated understanding and agreed to proceed. Written consent was obtained.    Procedural Details  The use of direct ultrasound visualization of the needle (rather than a non-guided injection) was required to increase patient safety by excluding inadvertent intramuscular or intratendinous placement and minimizing bleeding by avoiding osteochondral or vascular injury from the needle.  Additionally, the increased accuracy of placement may increase clinical effectiveness and will allow higher diagnostic specificity when evaluating effectiveness of this injection.  Using ultrasound, a pre-scan of the region was performed to identify the target structure.     The area was prepped with chloraprep, then re-examined using the same transducer, a sterile ultrasound transducer cover, and sterile ultrasound gel.    Procedural pause conducted to verify: Correct patient identity, procedure to be performed, and as applicable, correct side and site, correct patient position, and availability of implants, special equipment, or special requirements.    Procedure: Right knee  Transducer: 4-12 MHz Linear   Patient position: Supine with the knee bent to 30 degrees.  Localization process: The suprapatellar recess was localized in a short axis  view.  Local anesthesia: Local anesthesia was obtained with 3 cc of 1% lidocaine.  Needle: A 25-gauge, 1.5-inch needle was used for local anesthesia and injectate.  Approach: A lateral to medial, in plane, approach was used to guide the needle tip into the suprapatellar recess deep to the quadriceps tendon and superficial to the prefemoral fat pad.   Injection: A mixture of 4 cc of normal saline and 1 cc of triamcinolone 40mg/ml was injected into the right knee joint without complication.    Procedure: Left  knee  Transducer: 4-12 MHz Linear   Patient position: Supine with the knee bent to 30 degrees.  Localization process: The suprapatellar recess was localized in a short axis view.  Local anesthesia: Local anesthesia was obtained with 3 cc of 1% lidocaine.  Needle: A 25-gauge 1.5 inch needle was used for the local anesthesia and injectate.  Approach: A lateral to medial, in plane, approach was used to guide the needle tip into the suprapatellar recess deep to the quadriceps tendon and superficial to the prefemoral fat pad.   Injection: A mixture of 4 cc of normal saline and 1 cc of triamcinolone 40mg/ml was injected into the left knee joint without complication.    Post-procedural care: The patient tolerated the procedure well. He reported excellent pain relief during the anesthetic phase.     Pre-procedural pain level:  Right knee: 4 to 5/10  Left knee: 7 to 8/10    Post-procedural pain level:  Right knee: 2 to 3/10  Left knee: 4 to 5/10    The patient was asked to ice for improved pain control and avoid submerging the area in water for the next 48 hours to help reduce the risk of infection. The patient was instructed to call the office immediately if there are any questions or concerns. He will plan to follow up in 4 weeks for Synvisc One.    Corrie Castellon MD, [facility]  Sports Medicine Attending Physician  Department of Physical Medicine & Rehabilitation       Large Joint Injection/Arthocentesis: bilateral  knee    Date/Time: 11/8/2024 2:18 PM    Performed by: Corrie Castellon MD  Authorized by: Corrie Castellon MD    Indications:  Pain and osteoarthritis  Needle Size:  25 G  Guidance: ultrasound    Approach:  Anterolateral  Location:  Knee  Laterality:  Bilateral      Medications (Right):  40 mg triamcinolone 40 MG/ML; 2.5 mL lidocaine 1 %   Medications (Right) comment:  Sodium chloride (PF) 0.9% PF, 4 mL, Lot: [id number], Exp: 04/30/2025  Medications (Left):  40 mg triamcinolone 40 MG/ML; 2.5 mL lidocaine 1 %   Medications (Left) comment:  Sodium chloride (PF) 0.9% PF, 4 mL, Lot: [id number], Exp: 04/30/2025  Outcome:  Tolerated well, no immediate complications  Procedure discussed: discussed risks, benefits, and alternatives    Consent Given by:  Patient  Timeout: timeout called immediately prior to procedure    Prep: patient was prepped and draped in usual sterile fashion     Ultrasound was used to ensure safe and accurate needle placement and injection. Ultrasound images of the procedure were permanently stored.

## 2024-11-08 NOTE — PATIENT INSTRUCTIONS
Jerson Lynn, It was nice to see you in our office today.      DIAGNOSIS:   1. Bilateral primary osteoarthritis of knee    2. Chronic pain of both knees      PROCEDURE: Today you had an ultrasound-guided bilateral knee corticosteroid Injections    POST-INJECTION INSTRUCTIONS:  No increased activity the day of the injection, but it is okay to perform typical daily activities. Gradually increase your activities after 24 hours as tolerated.  Do not submerge the area in water for 48 hours, but you can take a shower.    Ice the area if you are sore by using ice packs for 10-15 minutes, 3 to 4 times a day for comfort if needed   You can take your normal over the counter medications to help with the pain if needed.    If you develop signs or symptoms of an infection (e.g., redness, swelling, increased pain, drainage, fever), suspect you may be having a reaction to the medication, or have any questions call [facility] at [phone]  during regular business hours.  If you are experiencing serious symptoms, call 911 or go to the emergency room.     INSTRUCTIONS FOR FOLLOW-UP CARE:  Activity Status Recommended:As stated above  Rehabilitation: Continue physical therapy  Follow up: 4 weeks for Synvisc One     CLINIC LOCATIONS:     [place] [address], [address] TRIAGE LINE: [phone]   [place] [address] APPOINTMENTS: [phone]   (Monday & Friday) RADIOLOGY: [phone]    MRI/CT [phone]   [place] [facility]: [phone] [address] BILLING QUESTIONS: [phone]   [place] [address] FAX: [phone]   (Tuesday & Wednesday)        Thank you for choosing [facility]!    If you have any questions, please do not hesitate to reach out on [employer] or Call [phone] and ask for my team.    Corrie Castellon MD, [facility]

## 2024-11-13 ENCOUNTER — OFFICE VISIT (OUTPATIENT)
Dept: PHARMACY | Facility: CLINIC | Age: 59
End: 2024-11-13
Payer: COMMERCIAL

## 2024-11-13 DIAGNOSIS — E11.9 TYPE 2 DIABETES MELLITUS WITHOUT COMPLICATION, WITHOUT LONG-TERM CURRENT USE OF INSULIN (H): Primary | ICD-10-CM

## 2024-11-13 DIAGNOSIS — I10 PRIMARY HYPERTENSION: ICD-10-CM

## 2024-11-13 DIAGNOSIS — F33.1 MODERATE EPISODE OF RECURRENT MAJOR DEPRESSIVE DISORDER (H): ICD-10-CM

## 2024-11-13 PROCEDURE — 99606 MTMS BY PHARM EST 15 MIN: CPT

## 2024-11-13 PROCEDURE — 99607 MTMS BY PHARM ADDL 15 MIN: CPT

## 2024-11-13 RX ORDER — QUETIAPINE FUMARATE 25 MG/1
25 TABLET, FILM COATED ORAL 2 TIMES DAILY PRN
Qty: 60 TABLET | Refills: 2 | Status: SHIPPED | OUTPATIENT
Start: 2024-11-13

## 2024-11-13 NOTE — PROGRESS NOTES
Medication Therapy Management (MTM) Encounter    ASSESSMENT:                            Medication Adherence/Access:  Patient seem to not remember some of his medications. Advised his to bring them next follow up    Diabetes Type II/weight loss: A1C above goal < 7%. Most of the CGM readings within the target range (70 - 180) 67%, slightly below goal of < 70%. Sugar has improved from previous follow up. Advised patient to take glipizide XL twice daily, and discontinue pioglitazone. Will keep the current dose of mounjaro. Will continue to follow up closely.     Hypertension: Recent BP has been within goal, but mostly above goal. Advised patient to check his BP and report it to the writer.    Mental Health   Anxiety, Depression, and Insomnia: Mood seems to be improving a little bit on fluoxetine; however, considering patient is having overwhelming anxiety and insomnia due to racing thoughts reasonable to start small dose of quetiapine used on as needed basis. Patient will be following [facility] soon. Patient could benefit from psychologist referral; will communicate with PCP.     PLAN:                            Please bring your BP readings or bring the BP cuff with you  Please take fluoxetine 40 mg daily rather than as needed   Take glipizide XL 10 mg twice daily with food, but if sugar starts to go down in the early morning then take it just in the morning   Start taking quetiapine 25 mg twice daily as needed for anxiety, sleep and racing thoughts  Stop taking pioglitazone 45 mg daily    PharmD to communicate with PCP about psychologist visit patient can benefit from      Follow-up: Due on 12/18/2024 @ 10 AM    SUBJECTIVE/OBJECTIVE:                          Jerson Lynn is a 59 year old male seen for a follow up visit.       Reason for visit: Medication Review and Diabetes Type II.    Allergies/ADRs: Reviewed in chart  Past Medical History: Reviewed in chart  Tobacco: He reports that he has never smoked. He has never been  "exposed to tobacco smoke. He has never used smokeless tobacco.  Alcohol: none  Medication Adherence/Access: Patient seem to not remember some of his medications. Advised to bring them next follow up    Diabetes  Type II:    Jardiance 25 mg daily - has not been taking    Zepbound 5 mg weekly    Glipizide XL 10 mg two times daily    Metformin 1000 mg BID   Aspirin 81mg daily  Patient noted he have some fogginess;he is having some delayed thinking.  Current diabetes symptoms: polyuria, polydipsia, polyphagia, fatigue, and numbness/tingling    Blood sugar monitoring: Check CGM below  Diet/Exercise: Patient noted      Eye exam is up to date  Foot exam: Due    He got his fabien sensor, but have not used it yet. He noted he will be taking it to his daughter to help him apply it on his arm and connect it with the billie.     There were no vitals filed for this visit.    Estimated body mass index is 37.15 kg/m  as calculated from the following:    Height as of 10/30/24: 5' 3\" (1.6 m).    Weight as of 10/30/24: 209 lb 11.2 oz (95.1 kg).    Lab Results   Component Value Date    A1C 8.4 08/07/2024             Results  CGM - Dexcom: Dexcom G7  and Sensors $0  CGM - Freestyle Fabien: Fabien 3 [place] and Sensors $0  DPP-4 - Januvia: $0  DPP-4 - Onglyza:   DPP-4 - Tradjenta:   GLP-1 - Adlyxin:   GLP-1 - Bydureon:   GLP-1 - Byetta:   GLP-1 - Mounjaro:   GLP-1 - Ozempic: $0  GLP-1 - Rybelsus:   GLP-1 - Trulicity: PA Required  GLP-1 - Victoza: $0  Glucagon - Baqsimi:   Glucagon:     Soliqua:   Xultophy:   NPH Insulin - Humulin N:   NPH Insulin - Novolin N:   Rapid-acting Insulin - Admelog:   Rapid-acting Insulin - Apidra:   Rapid-acting Insulin - Fiasp:   Rapid-acting Insulin - Humalog:   Rapid-acting Insulin - Lyumjev:   Rapid-acting Insulin - Novolog:   SGLT-2 - Brenzavvy:   SGLT-2 - Farxiga:   SGLT-2 - Invokana:   SGLT-2 - Jardiance:   SGLT-2 - Steglatro:   Testing Supplies - AccuCheck:   Testing Supplies - Capri Contour: "   Testing Supplies - OneTouch:   Weight Loss - Saxenda: $0 copay  Weight Loss - Wegovy: $0 copay  Weight Loss - Zepbound: $0 copay     Hypertension :    Hyzaar 100 - 25 mg daily   Patient reports no current medication side effects  Patient does not self-monitor blood pressure.  Patient asked for new BP cuff. Order sent to pharmacy.     BP Readings from Last 3 Encounters:   10/30/24 135/88   10/22/24 118/82   10/17/24 130/88     No home BP readings at this time.       Mental Health   Anxiety, Depression, and Insomnia:    Fluoxetine 40 mg daily   Patient reports no current medication side effects.  Patient is wondering if these medication are causing him to gain weight   - Noted whenever his mind is wondering he takes this medication and feels better; it seems he might be taking it as needed.      Previously, patient was active, but went through divorce and that has affected his physical and mental health. This has been going on for the last 10 years. Is not able to sleep due to his current situation/racing thoughts.   - Sometimes he worries if he is losing his mental faculties; asked what he is going through is it a diseases or if he is getting crazy. He is worried about getting into accident when driving due to being overwhelmed or extremely anxious.       Today's Vitals: There were no vitals taken for this visit.  ----------------      I spent 60 minutes with this patient today. All changes were made via collaborative practice agreement with Clemente Frey MD. A copy of the visit note was provided to the patient's provider(s).    A summary of these recommendations was given to the patient.     Medication Therapy Recommendations  Moderate episode of recurrent major depressive disorder (H)   1 Current Medication: QUEtiapine (SEROQUEL) 25 MG tablet   Current Medication Sig: Take 1 tablet (25 mg) by mouth 2 times daily as needed (racing thoughts).   Rationale: Synergistic therapy - Needs additional medication  therapy - Indication   Recommendation: Start Medication - QUEtiapine 25 MG tablet   Status: Accepted per CPA   Identified Date: 11/13/2024 Completed Date: 11/13/2024         Type 2 diabetes mellitus without complication, without long-term current use of insulin (H)   1 Current Medication: glipiZIDE (GLUCOTROL XL) 10 MG 24 hr tablet   Current Medication Sig: Take 1 tablet (10 mg) by mouth 2 times daily.   Rationale: Dose too low - Dosage too low - Effectiveness   Recommendation: Increase Frequency - Increase glipizide frequency from once daily to twice daily   Status: Accepted per CPA   Identified Date: 11/13/2024 Completed Date: 11/13/2024              Stefanie Funk, PharmD     Medication Therapy Management (MTM) Pharmacist     [phone]     [email or web address]

## 2024-11-13 NOTE — PATIENT INSTRUCTIONS
"Recommendations from today's MTM visit:                                                      MTM (medication therapy management) is a service provided by a clinical pharmacist designed to help you get the most of out of your medicines.   Today we reviewed what your medicines are for, how to know if they are working, that your medicines are safe and how to make your medicine regimen as easy as possible.      Please bring your BP readings or bring the BP cuff with you  Please take fluoxetine 40 mg daily rather than as needed   Take glipizide XL 10 mg twice daily with food, but if sugar starts to go down in the early morning then take it just in the morning   Start taking quetiapine 25 mg twice daily as needed for anxiety, sleep and racing thoughts  Stop taking pioglitazone 45 mg daily    PharmD to communicate with PCP about psychologist visit patient can benefit from      Follow-up: Due on 12/18/2024 @ 10 AM    It was great speaking with you today.  I value your experience and would be very thankful for your time in providing feedback in our clinic survey. In the next few days, you may receive an email or text message from [employer] with a link to a survey related to your  clinical pharmacist.\"     To schedule another MTM appointment, please call the clinic directly or you may call the MTM scheduling line at [phone].    My Clinical Pharmacist's contact information:                                                      Please feel free to contact me with any questions or concerns you have.        Stefanie Funk, PharmD     Medication Therapy Management (MTM) Pharmacist     [phone]     [email or web address]  "

## 2024-11-26 ENCOUNTER — PATIENT OUTREACH (OUTPATIENT)
Dept: CARE COORDINATION | Facility: CLINIC | Age: 59
End: 2024-11-26
Payer: COMMERCIAL

## 2024-11-26 NOTE — PROGRESS NOTES
Care Coordination Clinician Chart Review    Situation: Patient chart reviewed by Care Coordinator.       Background: Care Coordination Program started: 10/11/2024. Initial assessment completed and patient-centered care plan(s) were developed with participation from patient. Lead CC handed patient off to CHW for continued outreaches.       Assessment: Per chart review, patient outreach completed by CC CHW on 11-.  Patient is actively working to accomplish goal(s). Patient's goal(s) appropriate and relevant at this time. Patient is not due for updated Plan of Care.  Assessments will be completed annually or as needed/with change of patient status.      Care Plan: Financial Wellbeing       Problem: Patient expresses financial resource strain       Long-Range Goal: Apply for Social Security Disability.       Start Date: 10/18/2024 Expected End Date: 10/17/2025    This Visit's Progress: 10%    Priority: High    Note:     Barriers: long process, stressed, anxiety is high, language barrier.  Strengths: motivated, has some family support.  Patient expressed understanding of goal: yes  Action steps to achieve this goal:  1. I will work with Disability Specialists to see if I can apply for Disability through Social Security.   2. I will complete application process.   3. I will report progress towards this goal at scheduled outreach telephone calls from the CCC team.    11/22- discussed with patient, states he has started the papework                               Care Plan: Mental Health       Problem: Mental Health Symptoms Need Improvement       Long-Range Goal: Improve management of mental health symptoms and establish with mental health/psychosocial supports       Start Date: 10/18/2024 Expected End Date: 10/17/2025    This Visit's Progress: 20%    Priority: High    Note:     Barriers: very stressed, anxiety.  Strengths: motivated, engaging with care team, support from daughter.  Patient expressed understanding of  goal: yes  Action steps to achieve this goal:  1. I will work with [facility] to get support from therapy, and medication management.  2. I will work with [facility] program and will learn new skills.  3. I will report progress towards this goal at scheduled outreach telephone calls from the CCC team.    11/22- discussed with patient, has appt scheduled on 12/19 at 1:00 pm                                   Plan/Recommendations: The patient will continue working with Care Coordination to achieve goal(s) as above. CHW will continue outreaches at minimum every 30 days and will involve Lead CC as needed or if patient is ready to move to Maintenance. Lead CC will continue to monitor CHW outreaches and patient's progress to goal(s) every 6 weeks.     Plan of Care updated and sent to patient: No

## 2024-11-27 ENCOUNTER — VIRTUAL VISIT (OUTPATIENT)
Dept: INTERNAL MEDICINE | Facility: CLINIC | Age: 59
End: 2024-11-27
Payer: COMMERCIAL

## 2024-11-27 DIAGNOSIS — E11.9 TYPE 2 DIABETES MELLITUS WITHOUT COMPLICATION, WITHOUT LONG-TERM CURRENT USE OF INSULIN (H): Primary | ICD-10-CM

## 2024-11-27 DIAGNOSIS — F33.1 MODERATE EPISODE OF RECURRENT MAJOR DEPRESSIVE DISORDER (H): ICD-10-CM

## 2024-11-27 PROCEDURE — 99442 PR PHYSICIAN TELEPHONE EVALUATION 11-20 MIN: CPT | Mod: 93 | Performed by: INTERNAL MEDICINE

## 2024-11-27 RX ORDER — TIRZEPATIDE 2.5 MG/.5ML
2.5 INJECTION, SOLUTION SUBCUTANEOUS
Qty: 7.5 ML | Refills: 1 | Status: SHIPPED | OUTPATIENT
Start: 2024-11-27

## 2024-11-27 NOTE — PROGRESS NOTES
"Jerson is a 59 year old who is being evaluated via a billable telephone visit.    What phone number would you like to be contacted at? Call  at [phone] press option #1 and then connect via patient's phone at [phone]  How would you like to obtain your AVS? Patrica  Originating Location (pt. Location): Home    Distant Location (provider location):  On-site    Assessment & Plan     (E11.9) Type 2 diabetes mellitus without complication, without long-term current use of insulin (H)  (primary encounter diagnosis)  Comment: improved controlled, some difficulties with coverage with GLP-1 though also states that the higher dose of 5mg caused GI upset  Plan: ZEPBOUND 2.5 MG/0.5ML prefilled pen        He is fine to return to 2.5mg though efficacy is in question. He does have follow up with MTM in the coming 3 weeks. They can sort things out a bit more at that time.    (F33.1) Moderate episode of recurrent major depressive disorder (H)  Comment: stable, no changes, minor adjustments through MTM  Plan: has upcoming appointment with CCPS which he is looking forward to.    See me in 4 months.          BMI  Estimated body mass index is 37.15 kg/m  as calculated from the following:    Height as of 10/30/24: 1.6 m (5' 3\").    Weight as of 10/30/24: 95.1 kg (209 lb 11.2 oz).             Subjective   Jerson is a 59 year old, presenting for the following health issues:  Knee Pain (Knee pains follow up; pt reports that he recently received steroid injections on 11/8/24 and noted some improvement ) and Diabetes (Pt reports that his insurance is unable to cover Zepbound, discuss possible changes in Rx )      11/27/2024     2:41 PM   Additional Questions   Roomed by Gisela   Accompanied by alone         11/27/2024     2:41 PM   Patient Reported Additional Medications   Patient reports taking the following new medications none     Knee Pain    History of Present Illness       Reason for visit:  Chronic pain of both knees & " Diabetes Follow up    He eats 2-3 servings of fruits and vegetables daily.He consumes 0 sweetened beverage(s) daily.   He is taking medications regularly.                   Objective           Vitals:  No vitals were obtained today due to virtual visit.    Physical Exam   General: Alert and no distress //Respiratory: No audible wheeze, cough, or shortness of breath // Psychiatric:  Appropriate affect, tone, and pace of words            Phone call duration: 20 minutes  Signed Electronically by: Clemente Frey MD

## 2024-12-09 ENCOUNTER — APPOINTMENT (OUTPATIENT)
Dept: INTERPRETER SERVICES | Facility: CLINIC | Age: 59
End: 2024-12-09
Payer: COMMERCIAL

## 2024-12-09 ENCOUNTER — VIRTUAL VISIT (OUTPATIENT)
Dept: PSYCHIATRY | Facility: CLINIC | Age: 59
End: 2024-12-09
Attending: INTERNAL MEDICINE
Payer: COMMERCIAL

## 2024-12-09 DIAGNOSIS — F33.1 MODERATE EPISODE OF RECURRENT MAJOR DEPRESSIVE DISORDER (H): Primary | ICD-10-CM

## 2024-12-09 DIAGNOSIS — F43.10 PTSD (POST-TRAUMATIC STRESS DISORDER): ICD-10-CM

## 2024-12-09 DIAGNOSIS — G47.00 INSOMNIA, UNSPECIFIED TYPE: ICD-10-CM

## 2024-12-09 RX ORDER — LURASIDONE HYDROCHLORIDE 20 MG/1
20 TABLET, FILM COATED ORAL
Qty: 30 TABLET | Refills: 0 | Status: SHIPPED | OUTPATIENT
Start: 2024-12-09 | End: 2025-01-08

## 2024-12-09 RX ORDER — PRAZOSIN HYDROCHLORIDE 1 MG/1
1 CAPSULE ORAL AT BEDTIME
Qty: 30 CAPSULE | Refills: 0 | Status: SHIPPED | OUTPATIENT
Start: 2024-12-09 | End: 2025-01-08

## 2024-12-09 ASSESSMENT — PAIN SCALES - GENERAL: PAINLEVEL_OUTOF10: NO PAIN (0)
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
## 2024-12-11 ENCOUNTER — APPOINTMENT (OUTPATIENT)
Dept: INTERPRETER SERVICES | Facility: CLINIC | Age: 59
End: 2024-12-11
Payer: COMMERCIAL

## 2024-12-18 ENCOUNTER — OFFICE VISIT (OUTPATIENT)
Dept: PHARMACY | Facility: CLINIC | Age: 59
End: 2024-12-18
Payer: COMMERCIAL

## 2024-12-18 DIAGNOSIS — F33.1 MODERATE EPISODE OF RECURRENT MAJOR DEPRESSIVE DISORDER (H): ICD-10-CM

## 2024-12-18 DIAGNOSIS — E11.9 TYPE 2 DIABETES MELLITUS WITHOUT COMPLICATION, WITHOUT LONG-TERM CURRENT USE OF INSULIN (H): ICD-10-CM

## 2024-12-18 DIAGNOSIS — I10 PRIMARY HYPERTENSION: Primary | ICD-10-CM

## 2024-12-18 PROCEDURE — 99607 MTMS BY PHARM ADDL 15 MIN: CPT

## 2024-12-18 PROCEDURE — 99606 MTMS BY PHARM EST 15 MIN: CPT

## 2024-12-18 RX ORDER — GLIPIZIDE 10 MG/1
10 TABLET, FILM COATED, EXTENDED RELEASE ORAL DAILY
Qty: 90 TABLET | Refills: 2 | Status: SHIPPED | OUTPATIENT
Start: 2024-12-18

## 2024-12-18 NOTE — PROGRESS NOTES
Medication Therapy Management (MTM) Encounter    ASSESSMENT:                          Medication Therapy Management (MTM) Encounter    ASSESSMENT:                            Medication Adherence/Access:  Patient seem to not remember some of his medications. Advised his to bring them next follow up    Diabetes Type II/weight loss: A1C above goal < 7%. Most of the CGM readings within the target range (70 - 180) 90%, above goal of > 70%. Considering low sugar readings in the morning reasonable to stop the evening glipizide XL dose. Will continue to follow up closely.     Hypertension: Recent BP has been within goal, but mostly above goal. Advised patient to check his BP and report it to the writer.    Mental Health   Anxiety, Depression, and Insomnia: Patient started to follow Psych, and ordered medications for psychosis and PTSD. Considering patient is having tiredness in the morning reasonable to stop quetiapine. Patient will continue to follow with Psych. Will follow up closely.   PLAN:                            Decrease glipizide XL dose from twice daily to once daily in the morning  Please bring your BP readings or bring the BP cuff with you  Please slowly stop taking quetiapine to limit tiredness in the morning     Follow-up: Due on 01/31/2025 @ 10 AM    SUBJECTIVE/OBJECTIVE:                          Jerson Lynn is a 59 year old male seen for a follow up visit. Professional Handshake was used (Cl in house interpretor)    Reason for visit: Medication Review and Diabetes Type II.    Allergies/ADRs: Reviewed in chart  Past Medical History: Reviewed in chart  Tobacco: He reports that he has never smoked. He has never been exposed to tobacco smoke. He has never used smokeless tobacco.  Alcohol: none  Medication Adherence/Access: Patient seem to not remember some of his medications. Advised to bring them next follow up    Diabetes  Type II:    Jardiance 25 mg daily - has not been taking    Zepbound 2.5 mg weekly  "   Glipizide XL 10 mg two times daily    Metformin 1000 mg BID   Aspirin 81mg daily  Patient noted he have some fogginess;he is having some delayed thinking.  Current diabetes symptoms: Recently, noted he is getting tired at night and his sensor alarm  is going off at night.     Blood sugar monitoring: Check CGM below  Diet/Exercise: Patient noted      Eye exam is up to date  Foot exam: Due    There were no vitals filed for this visit.    Estimated body mass index is 37.15 kg/m  as calculated from the following:    Height as of 10/30/24: 5' 3\" (1.6 m).    Weight as of 10/30/24: 209 lb 11.2 oz (95.1 kg).    Lab Results   Component Value Date    A1C 7.2 10/30/2024    A1C 8.4 08/07/2024           Results  CGM - Dexcom: Dexcom G7  and Sensors $0  CGM - Freestyle Fabien: Fabien 3 [place] and Sensors $0  DPP-4 - Januvia: $0  DPP-4 - Onglyza:   DPP-4 - Tradjenta:   GLP-1 - Adlyxin:   GLP-1 - Bydureon:   GLP-1 - Byetta:   GLP-1 - Mounjaro:   GLP-1 - Ozempic: $0  GLP-1 - Rybelsus:   GLP-1 - Trulicity: PA Required  GLP-1 - Victoza: $0  Glucagon - Baqsimi:   Glucagon:     Soliqua:   Xultophy:   NPH Insulin - Humulin N:   NPH Insulin - Novolin N:   Rapid-acting Insulin - Admelog:   Rapid-acting Insulin - Apidra:   Rapid-acting Insulin - Fiasp:   Rapid-acting Insulin - Humalog:   Rapid-acting Insulin - Lyumjev:   Rapid-acting Insulin - Novolog:   SGLT-2 - Brenzavvy:   SGLT-2 - Farxiga:   SGLT-2 - Invokana:   SGLT-2 - Jardiance:   SGLT-2 - Steglatro:   Testing Supplies - AccuCheck:   Testing Supplies - Capri Contour:   Testing Supplies - OneTouch:   Weight Loss - Saxenda: $0 copay  Weight Loss - Wegovy: $0 copay  Weight Loss - Zepbound: $0 copay     Hypertension :    Hyzaar 100 - 25 mg daily   Patient reports no current medication side effects  Patient does not self-monitor blood pressure.  Patient asked for new BP cuff. Order sent to pharmacy.       BP Readings from Last 3 Encounters:   10/30/24 135/88   10/22/24 118/82 "   10/17/24 130/88     Patient noted his BP has been around 130 - 140/80 - 90.     Mental Health   Anxiety, Depression, and Insomnia:    Fluoxetine 40 mg daily   Patient reports no current medication side effects.  Patient is wondering if these medication are causing him to gain weight   - Noted whenever his mind is wondering he takes this medication and feels better; it seems he might be taking it as needed.      Previously, patient was active, but went through divorce and that has affected his physical and mental health. This has been going on for the last 10 years. Is not able to sleep due to his current situation/racing thoughts.   - Sometimes he worries if he is losing his mental faculties; asked what he is going through is it a diseases or if he is getting crazy. He is worried about getting into accident when driving due to being overwhelmed or extremely anxious.   - He got a new medication after his Psych visit on 12/09/2024; Latuda and prazosin. He complained these medications are helping him sleep, but they are making him tired and does not want to be more active. Noted he does not have nightmare at this time and it is seems the medication is helping control the nightmares.       Today's Vitals: There were no vitals taken for this visit.  ----------------      I spent 50 minutes with this patient today. All changes were made via collaborative practice agreement with Clemente Frey MD.     A summary of these recommendations was given to the patient.     Medication Therapy Recommendations  Moderate episode of recurrent major depressive disorder (H)   1 Current Medication: QUEtiapine (SEROQUEL) 25 MG tablet (Discontinued)   Current Medication Sig: Take 1 tablet (25 mg) by mouth 2 times daily as needed (racing thoughts).   Rationale: Unsafe medication for the patient - Adverse medication event - Safety   Recommendation: Discontinue Medication   Status: Accepted per CPA   Identified Date: 12/18/2024  Completed Date: 12/18/2024         Type 2 diabetes mellitus without complication, without long-term current use of insulin (H)   1 Current Medication: glipiZIDE (GLUCOTROL XL) 10 MG 24 hr tablet   Current Medication Sig: Take 1 tablet (10 mg) by mouth daily.   Rationale: Dose too high - Dosage too high - Safety   Recommendation: Decrease Frequency - glipiZIDE 10 MG 24 hr tablet - Decrease glipizide frequency from once daily to twice daily   Status: Accepted per CPA   Identified Date: 12/18/2024 Completed Date: 12/18/2024              Stefanie Funk, PharmD     Medication Therapy Management (MTM) Pharmacist     [phone]     [email or web address]

## 2024-12-18 NOTE — PATIENT INSTRUCTIONS
"Recommendations from today's MTM visit:                                                      MTM (medication therapy management) is a service provided by a clinical pharmacist designed to help you get the most of out of your medicines.   Today we reviewed what your medicines are for, how to know if they are working, that your medicines are safe and how to make your medicine regimen as easy as possible.      Decrease glipizide XL dose from twice daily to once daily in the morning  Please bring your BP readings or bring the BP cuff with you  Please slowly stop taking quetiapine to limit tiredness in the morning     Follow-up: Due on 01/31/2025 @ 10 AM    It was great speaking with you today.  I value your experience and would be very thankful for your time in providing feedback in our clinic survey. In the next few days, you may receive an email or text message from [facility] with a link to a survey related to your  clinical pharmacist.\"     To schedule another MTM appointment, please call the clinic directly or you may call the MTM scheduling line at [phone].    My Clinical Pharmacist's contact information:                                                      Please feel free to contact me with any questions or concerns you have.        Stefanie Funk, PharmD     Medication Therapy Management (MTM) Pharmacist     [phone]     [email or web address]     [facility]    "

## 2024-12-26 ENCOUNTER — VIRTUAL VISIT (OUTPATIENT)
Dept: BEHAVIORAL HEALTH | Facility: CLINIC | Age: 59
End: 2024-12-26
Attending: STUDENT IN AN ORGANIZED HEALTH CARE EDUCATION/TRAINING PROGRAM
Payer: COMMERCIAL

## 2024-12-26 DIAGNOSIS — F33.1 MODERATE EPISODE OF RECURRENT MAJOR DEPRESSIVE DISORDER (H): ICD-10-CM

## 2024-12-26 PROCEDURE — 90832 PSYTX W PT 30 MINUTES: CPT | Mod: 95 | Performed by: COUNSELOR

## 2024-12-26 ASSESSMENT — ANXIETY QUESTIONNAIRES
6. BECOMING EASILY ANNOYED OR IRRITABLE: NEARLY EVERY DAY
3. WORRYING TOO MUCH ABOUT DIFFERENT THINGS: MORE THAN HALF THE DAYS
7. FEELING AFRAID AS IF SOMETHING AWFUL MIGHT HAPPEN: MORE THAN HALF THE DAYS
IF YOU CHECKED OFF ANY PROBLEMS ON THIS QUESTIONNAIRE, HOW DIFFICULT HAVE THESE PROBLEMS MADE IT FOR YOU TO DO YOUR WORK, TAKE CARE OF THINGS AT HOME, OR GET ALONG WITH OTHER PEOPLE: VERY DIFFICULT
1. FEELING NERVOUS, ANXIOUS, OR ON EDGE: MORE THAN HALF THE DAYS
2. NOT BEING ABLE TO STOP OR CONTROL WORRYING: NEARLY EVERY DAY
GAD7 TOTAL SCORE: 18
GAD7 TOTAL SCORE: 18
5. BEING SO RESTLESS THAT IT IS HARD TO SIT STILL: NEARLY EVERY DAY

## 2024-12-26 ASSESSMENT — COLUMBIA-SUICIDE SEVERITY RATING SCALE - C-SSRS
6. IN YOUR LIFETIME, HAVE YOU EVER DONE ANYTHING, STARTED TO DO ANYTHING, OR PREPARED TO DO ANYTHING TO END YOUR LIFE?: NO
2. HAVE YOU ACTUALLY HAD ANY THOUGHTS OF KILLING YOURSELF?: NO
1. IN THE PAST MONTH, HAVE YOU WISHED YOU WERE DEAD OR WISHED YOU COULD GO TO SLEEP AND NOT WAKE UP?: NO

## 2024-12-26 ASSESSMENT — PATIENT HEALTH QUESTIONNAIRE - PHQ9
5. POOR APPETITE OR OVEREATING: NEARLY EVERY DAY
SUM OF ALL RESPONSES TO PHQ QUESTIONS 1-9: 22
SUM OF ALL RESPONSES TO PHQ QUESTIONS 1-9: 22
10. IF YOU CHECKED OFF ANY PROBLEMS, HOW DIFFICULT HAVE THESE PROBLEMS MADE IT FOR YOU TO DO YOUR WORK, TAKE CARE OF THINGS AT HOME, OR GET ALONG WITH OTHER PEOPLE: EXTREMELY DIFFICULT

## 2024-12-26 NOTE — PROGRESS NOTES
[facility] Primary Care: Integrated Behavioral Health    Integrated Behavioral Health   Mental Health & Addiction Services      Progress Note - Initial [facility] Visit     Patient Name: Jerson Lynn    Date: 2024  Service Type: Consult Note   Visit Start Time: 10:01 AM  Visit End Time:   10:30 am    Attendees: Patient and Inter    Service Modality: Video Visit:      Provider verified identity through the following two step process.  Patient provided:  Patient     Telemedicine Visit: The patient's condition can be safely assessed and treated via synchronous audio and visual telemedicine encounter.      Reason for Telemedicine Visit: Patient has requested telehealth visit    Originating Site (Patient Location): Patient's home    Distant Site (Provider Location): [facility]    Consent:  The patient/guardian has verbally consented to: the potential risks and benefits of telemedicine (video visit) versus in person care; bill my insurance or make self-payment for services provided; and responsibility for payment of non-covered services.     Patient would like the video invitation sent by:  My Chart    Mode of Communication:  Video Conference via [facility]    Distant Location (Provider):  On-site    As the provider I attest to compliance with applicable laws and regulations related to telemedicine.     [facility] Visit Activities (Refresh list every visit): NEW         DATA:     Interactive Complexity: No   Crisis: No     Assessments completed prior to this visit:     The following assessments were completed by patient for this visit:      PHQ9:       2024     2:53 PM 10/17/2024    10:14 AM 10/30/2024     4:09 PM 2024     1:06 PM 2024     9:53 AM   PHQ-9 SCORE   PHQ-9 Total Score MyChart 22 (Severe depression) 23 (Severe depression)  23 (Severe depression) 22 (Severe depression)   PHQ-9 Total Score 22 23 19 23  22        Patient-reported    Proxy-reported      GAD2:        No data to display              GAD7:       10/30/2024     4:11 PM   PILAR-7 SCORE   Total Score 16     CAGE-AID:       12/26/2024    10:07 AM   CAGE-AID Total Score   Total Score 0     PROMIS 10-Global Health (only subscores and total score):        No data to display              [place] Suicide Severity Rating Scale (Lifetime/Recent)      12/26/2024    10:01 AM   [place] Suicide Severity Rating (Lifetime/Recent)   1. Wish to be Dead (Past 1 Month) N   2. Non-Specific Active Suicidal Thoughts (Past 1 Month) N   Calculated C-SSRS Risk Score (Lifetime/Recent) No Risk Indicated        Patient-reported       Referral:   Patient was referred to [facility] by self and primary care provider.    Reason for referral: determine behavioral health treatment options.      [facility] introduced self and role. Discussed informed consent and limits to confidentiality.     Presenting Concerns/ Current Stressors:   Patient shared about his stress.  Patient  reports he struggles with his memory.  Patient disclosed that his anger started since 2004, but stress started in 2000.        Therapeutic Interventions:  Psycho-education: Explained and reviewed treatment options.    Response to treatment interventions:   Patient was receptive to interventions utilized.     Safety Issues and Plan for Safety and Risk Management:     Patient has had a history of suicidal ideation: when he was going through his divorce and suicide attempts: a few times stopped by mother    Patient denies current fears or concerns for personal safety.   Patient denies current or recent suicidal ideation or behaviors.   Patient denies current or recent homicidal ideation or behaviors.   Patient denies current or recent self injurious behavior or ideation.   Patient denies other safety concerns.   A safety and risk management plan has been developed including: Patient consented to co-developed safety plan.  Safety and risk management plan was completed - see  below.  Patient agreed to use safety plan should any safety concerns arise.  A copy was given to the patient.   Patient reports there are no firearms in the house.       ASSESSMENT:   Mental Status:     Appearance:   Appropriate    Eye Contact:   Fair    Psychomotor Behavior: Normal    Attitude:   Cooperative  Pleasant   Orientation:   All   Speech Rate / Production: Normal/ Responsive   Volume:   Normal    Mood:    Sad    Affect:    Flat    Thought Content:  Clear    Thought Form:  Coherent    Insight:    Poor         Diagnostic Criteria:   Major Depressive Disorder  CRITERIA (A-C) REPRESENT A MAJOR DEPRESSIVE EPISODE - SELECT THESE CRITERIA   - Depressed mood. Note: In children and adolescents, can be irritable mood.     - Diminished interest or pleasure in all, or almost all, activities.    - Fatigue or loss of energy.    - Feelings of worthlessness or excessive guilt.    - Diminished ability to think or concentrate, or indecisiveness.    - Recurrent thoughts of death (not just fear of dying), recurrent suicidal ideation without a specific plan, or a suicide attempt or a specific plan for committing suicide.   B) The symptoms cause clinically significant distress or impairment in social, occupational, or other important areas of functioning  C) The episode is not attributable to the physiological effects of a substance or to another medical condition  D) The occurence of major depressive episode is not better explained by other thought / psychotic disorders  E) There has never been a manic episode or hypomanic episode        DSM5 Diagnoses: (Sustained by DSM5 Criteria Listed Above)     Diagnoses: 296.32 (F33.1) Major Depressive Disorder, Recurrent Episode, Moderate _     Psychosocial / Contextual Factors: Relationship Concerns, Occupational Issues, Interpersonal Concerns, Limited Social Support, and Financial Strain       Collateral Reports Completed:   Not Applicable        PLAN: (Homework, other):     1.  Patient was provided:  recommendation to schedule follow-up with [facility] recommendation to follow through on referrals     2. Provider recommended the following referrals: Outpatient Services.        3. Suicide Risk and Safety Concerns were assessed for Christacoretta Lynn    Safety Plan:   Patient agreed to follow safety plan   [place] Safety Plan      Creation Date: 12/26/24       Step 1: Warning signs:    Warning Signs    stress      Step 2: Internal coping strategies - Things I can do to take my mind off my problems without contacting another person:    Strategies    ignore it    watch something      Step 3: People and social settings that provide distraction:    Name Contact Information    family          Step 4: People whom I can ask for help during a crisis:   No contacts identified     Step 5: Professionals or agencies I can contact during a crisis:    Clinician/Agency Name Phone Emergency Contact    [facility] Mobile team [phone].     [facility] [phone]     [phone]       Suicide Prevention Lifeline Phone: Call or Text 988  Crisis Text Line: Text HOME to 282812     Step 6: Making the environment safer (plan for lethal means safety):   Did not identify any lethal methods     Optional: What is most important to me and worth living for?:      [place] Safety Plan. Mary Washington and Andrea Saeed. Used with permission of the authors.             Padma Pardo [facility], [facility]   December 26, 2024

## 2025-01-06 ENCOUNTER — PATIENT OUTREACH (OUTPATIENT)
Dept: CARE COORDINATION | Facility: CLINIC | Age: 60
End: 2025-01-06
Payer: COMMERCIAL

## 2025-01-06 NOTE — PROGRESS NOTES
Clinic Care Coordination Contact  Community Health Worker Follow Up    Care Gaps:     Health Maintenance Due   Topic Date Due    DIABETIC FOOT EXAM  Never done    ADVANCE CARE PLANNING  Never done    DEPRESSION ACTION PLAN  Never done    COLORECTAL CANCER SCREENING  Never done    HEPATITIS B IMMUNIZATION (1 of 3 - 19+ 3-dose series) Never done    DTAP/TDAP/TD IMMUNIZATION (3 - Tdap) 11/21/1987    ZOSTER IMMUNIZATION (1 of 2) Never done       Care Gaps Last addressed on 1/6/2025    Care Plan:   Care Plan: Financial Wellbeing       Problem: Patient expresses financial resource strain       Long-Range Goal: Apply for Social Security Disability.       Start Date: 10/18/2024 Expected End Date: 10/17/2025    This Visit's Progress: 20% Recent Progress: 10%    Priority: High    Note:     Barriers: long process, stressed, anxiety is high, language barrier.  Strengths: motivated, has some family support.  Patient expressed understanding of goal: yes  Action steps to achieve this goal:  1. I will work with Disability Specialists to see if I can apply for Disability through Social Security.   2. I will complete application process.   3. I will report progress towards this goal at scheduled outreach telephone calls from the CCC team.    1/6 discussed, paperwork has been submitted, no update at this time  11/22- discussed with patient, states he has started the papework                               Care Plan: Mental Health       Problem: Mental Health Symptoms Need Improvement       Long-Range Goal: Improve management of mental health symptoms and establish with mental health/psychosocial supports       Start Date: 10/18/2024 Expected End Date: 10/17/2025    This Visit's Progress: 30% Recent Progress: 20%    Priority: High    Note:     Barriers: very stressed, anxiety.  Strengths: motivated, engaging with care team, support from daughter.  Patient expressed understanding of goal: yes  Action steps to achieve this goal:  1. I  will work with [facility] to get support from therapy, and medication management.  2. I will work with [facility] program and will learn new skills.  3. I will report progress towards this goal at scheduled outreach telephone calls from the [facility] team.    1/6/25- discussed, next appt on 1/10/25  11/22- discussed with patient, has appt scheduled on 12/19 at 1:00 pm                                Intervention and Education during outreach: Supportive Listening and Encouragement    CHW spoke to Jerson today for monthly CC outreach call, patient states he is doing well. He did informed CHW that from time to time he does get weak and tired, but does monitor his blood pressure and blood sugars and adjust medications. Jerson states his blood sugars are always under 200 and keeps track on his phone and keeps his sensors on his arm updated.  CHW and patient discussed and updated current CC goal progression during today's call, Jerson has submitted paperwork for Social Security Disability and is waiting on update- nothing at this time.  Jerson continues to see a therapist, his next appt is on Friday 1/10/2025    Next PCP appt: [id number] at 10:30 am    CHW Plan: Continue with monthly outreach call to discuss, support and update CC goal progression    Next CHW outreach call: 2/5/2025    Vinita BOYLE  Community Health Worker  [facility]  Clinic Care Coordination  [email or web address]  [email or web address]  Office: [phone]

## 2025-01-06 NOTE — PROGRESS NOTES
Care Coordination Clinician Chart Review    Situation: Patient chart reviewed by Care Coordinator.       Background: Care Coordination Program started: 10/11/2024. Initial assessment completed and patient-centered care plan(s) were developed with participation from patient. Lead CC handed patient off to CHW for continued outreaches.       Assessment: Per chart review, patient outreach completed by CC CHW on 1-6-2025.  Patient is actively working to accomplish goal(s). Patient's goal(s) appropriate and relevant at this time. Patient is due for updated Plan of Care.  Assessments will be completed annually or as needed/with change of patient status.      Care Plan: Financial Wellbeing       Problem: Patient expresses financial resource strain       Long-Range Goal: Apply for Social Security Disability.       Start Date: 10/18/2024 Expected End Date: 10/17/2025    This Visit's Progress: 20% Recent Progress: 10%    Priority: High    Note:     Barriers: long process, stressed, anxiety is high, language barrier.  Strengths: motivated, has some family support.  Patient expressed understanding of goal: yes  Action steps to achieve this goal:  1. I will work with Disability Specialists to see if I can apply for Disability through Social Security.   2. I will complete application process.   3. I will report progress towards this goal at scheduled outreach telephone calls from the CCC team.    1/6 discussed, paperwork has been submitted, no update at this time  11/22- discussed with patient, states he has started the papework                               Care Plan: Mental Health       Problem: Mental Health Symptoms Need Improvement       Long-Range Goal: Improve management of mental health symptoms and establish with mental health/psychosocial supports       Start Date: 10/18/2024 Expected End Date: 10/17/2025    This Visit's Progress: 30% Recent Progress: 20%    Priority: High    Note:     Barriers: very stressed,  anxiety.  Strengths: motivated, engaging with care team, support from daughter.  Patient expressed understanding of goal: yes  Action steps to achieve this goal:  1. I will work with [facility] to get support from therapy, and medication management.  2. I will work with [facility] program and will learn new skills.  3. I will report progress towards this goal at scheduled outreach telephone calls from the CCC team.    1/6/25- discussed, next appt on 1/10/25  11/22- discussed with patient, has appt scheduled on 12/19 at 1:00 pm                                   Plan/Recommendations: The patient will continue working with Care Coordination to achieve goal(s) as above. CHW will continue outreaches at minimum every 30 days and will involve Lead CC as needed or if patient is ready to move to Maintenance. Lead CC will continue to monitor CHW outreaches and patient's progress to goal(s) every 6 weeks.     Plan of Care updated and sent to patient: Yes, via mail

## 2025-01-06 NOTE — LETTER
[facility] CARE COORDINATION  [facility]    January 7, 2025        Jerson Lynn  [address] [place] [address]          Dear Jerson,     Jean is an updated Complex Care Plan for your continued enrollment in Care Coordination. Please let us know if you have additional questions, concerns, or goals that we can assist with.    Sincerely,    Celi Hernandez,   [facility]  [phone]        [facility]  Patient Centered Plan of Care  About Me:        Patient Name:  Jerson Lynn    YOB: 1965  Age:         59 year old   [facility] MRN:    [id number] Telephone Information:  Home Phone [phone]   Mobile [phone]       Address:  [address] [place] [address] Email address:  [email or web address]      Emergency Contact(s)    Name Relationship Lgl Grd Work Phone Home Phone Mobile Phone           Primary language:  Cherri     needed? Yes   [facility]:  [phone] op. 1  Other communication barriers:Language barrier; Physical impairment    Preferred Method of Communication:     Current living arrangement: I live in a private home with family (with son, his wife and their kids)    Mobility Status/ Medical Equipment: Independent        Health Maintenance  Health Maintenance Reviewed: Due/Overdue   Health Maintenance Due   Topic Date Due    DIABETIC FOOT EXAM  Never done    ADVANCE CARE PLANNING  Never done    DEPRESSION ACTION PLAN  Never done    COLORECTAL CANCER SCREENING  Never done    HEPATITIS B IMMUNIZATION (1 of 3 - 19+ 3-dose series) Never done    DTAP/TDAP/TD IMMUNIZATION (3 - Tdap) 11/21/1987    ZOSTER IMMUNIZATION (1 of 2) Never done           My Access Plan  Medical Emergency 911   Primary Clinic Line [facility] - [phone]   24 Hour Appointment Line [phone] or  [phone]) (toll-free)   24 Hour Nurse Line [phone] (toll-free)   Preferred Urgent Care [facility]  [facility], [phone]     Preferred Hospital [facility]  [phone]     Preferred Pharmacy [facility] - [place], [place] - [address]     Behavioral Health Crisis Line The National Suicide Prevention Lifeline at 1-883.613.8977 or Text/Call 988           My Care Team Members  Patient Care Team         Relationship Specialty Notifications Start End    Clemente Frey MD PCP - General Internal Medicine  8/7/24     Phone: [phone] Pager: [phone] Fax: [phone]        [address] [place] [address]    Clemente Frey MD Assigned PCP   8/23/24     Phone: [phone] Pager: [phone] Fax: [phone]        [address] [place] [address]    Stefanie Funk [facility] Pharmacist   9/9/24     Phone: [phone] Fax: [phone]         [address] [place] [address]    Stefanie Funk [facility] Assigned MTM Pharmacist   9/23/24     Phone: [phone] Fax: [phone]         [facility] [place] [address]    Celi Hernandez LSW Lead Care Coordinator Primary Care - CC Admissions 10/15/24     Phone: [phone]         Benita Lo CHW Community Health Worker  Admissions 10/18/24     Kranthi Parra, TAMMIE CNP Assigned Sleep Provider   10/23/24     Phone: [phone] Fax: [phone]         [address] [facility] [address] [facility] [address]    Corrie Castellon MD Assigned Neuroscience Provider   11/23/24     Phone: [phone] Fax: [phone] [address] [facility] [address] [facility] [address]    Brennen Garces PADeanaC Assigned Behavioral Health Provider   12/23/24     Phone: [phone] Fax: [phone]         [address] [place] [address]                My Care Plans  Self Management and Treatment Plan    Care Plan  Care Plan: Financial Wellbeing       Problem: Patient expresses financial resource strain       Long-Range Goal: Apply for Social Security Disability.       Start Date: 10/18/2024  Expected End Date: 10/17/2025    This Visit's Progress: 20% Recent Progress: 10%    Priority: High    Note:     Barriers: long process, stressed, anxiety is high, language barrier.  Strengths: motivated, has some family support.  Patient expressed understanding of goal: yes  Action steps to achieve this goal:  1. I will work with Disability Specialists to see if I can apply for Disability through Social Security.   2. I will complete application process.   3. I will report progress towards this goal at scheduled outreach telephone calls from the [facility] team.    1/6 discussed, paperwork has been submitted, no update at this time  11/22- discussed with patient, states he has started the papework                               Care Plan: Mental Health       Problem: Mental Health Symptoms Need Improvement       Long-Range Goal: Improve management of mental health symptoms and establish with mental health/psychosocial supports       Start Date: 10/18/2024 Expected End Date: 10/17/2025    This Visit's Progress: 30% Recent Progress: 20%    Priority: High    Note:     Barriers: very stressed, anxiety.  Strengths: motivated, engaging with care team, support from daughter.  Patient expressed understanding of goal: yes  Action steps to achieve this goal:  1. I will work with [facility] to get support from therapy, and medication management.  2. I will work with [facility] program and will learn new skills.  3. I will report progress towards this goal at scheduled outreach telephone calls from the [facility] team.    1/6/25- discussed, next appt on 1/10/25  11/22- discussed with patient, has appt scheduled on 12/19 at 1:00 pm                                  Advance Care Plans/Directives:   Advanced Care Plan/Directives on file: No    Discussed with patient/caregiver(s): Declined Further Information             My Medical and Care Information  Problem List   Patient Active Problem List   Diagnosis    Type 2 diabetes mellitus without  complication, without long-term current use of insulin (H)    Class 2 severe obesity due to excess calories with serious comorbidity in adult (H)    Mixed hyperlipidemia    Primary hypertension    Acquired hypothyroidism    Moderate episode of recurrent major depressive disorder (H)    Chronic pain of both knees      Current Medications:  Please refer to the most recent medication list provided to you by your medical team and reach out to your provider with any questions or to make any corrections.    Care Coordination Start Date: 10/11/2024   Frequency of Care Coordination: monthly, more frequently as needed     Form Last Updated: 01/07/2025

## 2025-01-17 ENCOUNTER — VIRTUAL VISIT (OUTPATIENT)
Dept: PSYCHIATRY | Facility: CLINIC | Age: 60
End: 2025-01-17
Payer: COMMERCIAL

## 2025-01-17 VITALS — DIASTOLIC BLOOD PRESSURE: 90 MMHG | SYSTOLIC BLOOD PRESSURE: 130 MMHG

## 2025-01-17 DIAGNOSIS — F43.10 PTSD (POST-TRAUMATIC STRESS DISORDER): ICD-10-CM

## 2025-01-17 DIAGNOSIS — G47.00 INSOMNIA, UNSPECIFIED TYPE: Primary | ICD-10-CM

## 2025-01-17 DIAGNOSIS — F33.1 MODERATE EPISODE OF RECURRENT MAJOR DEPRESSIVE DISORDER (H): ICD-10-CM

## 2025-01-17 RX ORDER — PRAZOSIN HYDROCHLORIDE 2 MG/1
2 CAPSULE ORAL AT BEDTIME
Qty: 30 CAPSULE | Refills: 1 | Status: SHIPPED | OUTPATIENT
Start: 2025-01-17

## 2025-01-17 RX ORDER — FLUOXETINE 40 MG/1
40 CAPSULE ORAL DAILY
Qty: 30 CAPSULE | Refills: 1 | Status: SHIPPED | OUTPATIENT
Start: 2025-01-17

## 2025-01-17 ASSESSMENT — PATIENT HEALTH QUESTIONNAIRE - PHQ9: SUM OF ALL RESPONSES TO PHQ QUESTIONS 1-9: 23

## 2025-01-17 ASSESSMENT — PAIN SCALES - GENERAL: PAINLEVEL_OUTOF10: MODERATE PAIN (6)

## 2025-01-17 NOTE — NURSING NOTE
Current patient location: [address] [place] [address]    Is the patient currently in the state of [place]? YES    Visit mode: TELEPHONE    If the visit is dropped, the patient can be reconnected by:TELEPHONE VISIT: Phone number: [phone]    Will anyone else be joining the visit? NO  (If patient encounters technical issues they should call [phone]    Are changes needed to the allergy or medication list? No    Are refills needed on medications prescribed by this physician? NO    Rooming Documentation:  Questionnaire(s) completed    Reason for visit: RECHECK    Kari BYRNE
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
## 2025-01-23 ASSESSMENT — SLEEP AND FATIGUE QUESTIONNAIRES
HOW LIKELY ARE YOU TO NOD OFF OR FALL ASLEEP WHILE SITTING AND TALKING TO SOMEONE: SLIGHT CHANCE OF DOZING
HOW LIKELY ARE YOU TO NOD OFF OR FALL ASLEEP WHILE LYING DOWN TO REST IN THE AFTERNOON WHEN CIRCUMSTANCES PERMIT: SLIGHT CHANCE OF DOZING
HOW LIKELY ARE YOU TO NOD OFF OR FALL ASLEEP WHILE WATCHING TV: SLIGHT CHANCE OF DOZING
HOW LIKELY ARE YOU TO NOD OFF OR FALL ASLEEP WHILE SITTING INACTIVE IN A PUBLIC PLACE: SLIGHT CHANCE OF DOZING
HOW LIKELY ARE YOU TO NOD OFF OR FALL ASLEEP WHEN YOU ARE A PASSENGER IN A CAR FOR AN HOUR WITHOUT A BREAK: SLIGHT CHANCE OF DOZING
HOW LIKELY ARE YOU TO NOD OFF OR FALL ASLEEP IN A CAR, WHILE STOPPED FOR A FEW MINUTES IN TRAFFIC: SLIGHT CHANCE OF DOZING
HOW LIKELY ARE YOU TO NOD OFF OR FALL ASLEEP WHILE SITTING QUIETLY AFTER LUNCH WITHOUT ALCOHOL: SLIGHT CHANCE OF DOZING
HOW LIKELY ARE YOU TO NOD OFF OR FALL ASLEEP WHILE SITTING AND READING: SLIGHT CHANCE OF DOZING

## 2025-01-27 ENCOUNTER — THERAPY VISIT (OUTPATIENT)
Dept: SLEEP MEDICINE | Facility: CLINIC | Age: 60
End: 2025-01-27
Attending: NURSE PRACTITIONER
Payer: COMMERCIAL

## 2025-01-27 DIAGNOSIS — R06.83 SNORING: ICD-10-CM

## 2025-01-27 DIAGNOSIS — G47.00 INSOMNIA, UNSPECIFIED TYPE: ICD-10-CM

## 2025-01-27 DIAGNOSIS — G47.9 SLEEP DISTURBANCE: ICD-10-CM

## 2025-01-27 DIAGNOSIS — R06.81 WITNESSED APNEIC SPELLS: ICD-10-CM

## 2025-01-27 DIAGNOSIS — E66.9 OBESITY (BMI 30-39.9): ICD-10-CM

## 2025-01-27 DIAGNOSIS — G47.33 OSA (OBSTRUCTIVE SLEEP APNEA): Primary | ICD-10-CM

## 2025-01-27 DIAGNOSIS — G47.19 EXCESSIVE DAYTIME SLEEPINESS: ICD-10-CM

## 2025-01-27 PROCEDURE — 95811 POLYSOM 6/>YRS CPAP 4/> PARM: CPT | Performed by: INTERNAL MEDICINE

## 2025-01-28 NOTE — PROGRESS NOTES
Completed a split night PSG per provider order.    Preliminary AHI 70.  A final therapeutic PAP pressure was not achieved.    Supine REM was not seen on therapeutic pressure.    Patient reports feeling refreshed in AM.

## 2025-01-29 DIAGNOSIS — E11.9 TYPE 2 DIABETES MELLITUS WITHOUT COMPLICATION, WITHOUT LONG-TERM CURRENT USE OF INSULIN (H): ICD-10-CM

## 2025-01-29 PROBLEM — G47.33 OSA (OBSTRUCTIVE SLEEP APNEA): Status: ACTIVE | Noted: 2025-01-29

## 2025-01-29 RX ORDER — TIRZEPATIDE 2.5 MG/.5ML
2.5 INJECTION, SOLUTION SUBCUTANEOUS
Qty: 2 ML | Refills: 1 | Status: SHIPPED | OUTPATIENT
Start: 2025-01-29

## 2025-01-29 NOTE — PROCEDURES
" SLEEP STUDY INTERPRETATION  SPLIT NIGHT STUDY      Patient: JOSR ROJAS  YOB: 1965  Study Date: 1/27/2025  MRN: [id number]  Referring Provider: Clemente Frey MD  Ordering Provider: Kranthi Parra APRN CNP    Indications for Polysomnography: The patient is a 59 year old Male who is 5' 2\" and weighs 215.0 lbs. His BMI is 39.6, Garrison sleepiness scale 19 and neck circumference is 45 cm. Relevant medical history includes hypertension, type 2 diabetes, hypothyroidism, depression. A diagnostic polysomnogram was performed to evaluate for sleep apnea/hypoventilation/hypoxemia. After 121.5 minutes of sleep time the patient exhibited sufficient respiratory events qualifying him for a CPAP trial which was then initiated.    Polysomnogram Data: A full night polysomnogram recorded the standard physiologic parameters including EEG, EOG, EMG, ECG, nasal and oral airflow. Respiratory parameters of chest and abdominal movements were recorded with respiratory inductance plethysmography. Oxygen saturation was recorded by pulse oximetry.  Hypopnea scoring rule used: 1B 4%    Diagnostic PSG  Sleep Architecture: Poor sleep efficiency with increased arousal index and increased wake after sleep onset.  The total recording time of the polysomnogram was 249.2 minutes. The total sleep time was 121.5 minutes. Sleep latency was normal at 21.7 minutes without the use of a sleep aid. REM latency was 203.0 minutes. Arousal index was increased at 103.7 arousals per hour. Sleep efficiency was decreased at 48.7%. Wake after sleep onset was 106.0 minutes. The patient spent 14.8% of total sleep time in Stage N1, 65.0% in Stage N2, 17.3% in Stage N3, and 2.9% in REM. Time in REM supine was - minutes.    Respiration: Severe obstructive sleep apnea with associated hypoxemia.  Events ? The polysomnogram revealed a presence of 8 obstructive, - central, and - mixed apneas resulting in an apnea index of 4.0 events per hour. There were " 141 obstructive hypopneas and - central hypopneas resulting in an obstructive hypopnea index of 69.6 and central hypopnea index of - events per hour. The combined apnea/hypopnea index was 73.6 events per hour (central apnea/hypopnea index was - events per hour).  The REM AHI was 68.6 events per hour. The supine AHI was 140.0 events per hour. The RERA index was 6.9 events per hour. The RDI was 80.5 events per hour.  Snoring - was reported as moderate/loud.  Respiratory rate and pattern - was notable for normal/tachypnea/Cheyne-Montgomery respiratory rate and pattern.  Sustained Sleep Associated Hypoventilation - Transcutaneous carbon dioxide monitoring was used, however significant hypoventilation was not present with a maximum change from 45 to 47 mmHg and 0 minutes at or greater than 55 mmHg.  Sleep Associated Hypoxemia - (Greater than 5 minutes O2 sat at or below 88%) was present. Baseline oxygen saturation was 92.6%. Lowest oxygen saturation was 77.0%. Time spent less than or equal to 88% was 17.5 minutes. Time spent less than or equal to 89% was 22.0 minutes.     Treatment PSG  Sleep Architecture: On CPAP decreased sleep efficient and increased arousal index persisted.  At 01:19:52 AM the patient was placed on PAP treatment and was titrated at pressures ranging from CPAP 5 cmH2O up to CPAP 16 cmH2O. The total recording time of the treatment portion of the study was 258.8 minutes. The total sleep time was 189.0 minutes. During the treatment portion of the study the sleep latency was 8.0 minutes. REM latency was 14.0 minutes. Arousal index was increased at 48.6 arousals per hour. Sleep efficiency was decreased at 73.0%. Wake after sleep onset was 61.5 minutes. The patient spent 10.1% of total sleep time in Stage N1, 43.7% in Stage N2, 25.1% in Stage N3, and 21.2% in REM. Time in REM supine was 18.5 minutes.     Respiration: Final CPAP setting 16 significantly improved obstructive events; some central developed at this  setting  The final pressure was CPAP 16 cmH2O with an AHI of 29.3 events per hour. Time in REM supine on final pressure was - minutes.   This titration was considered Adequate (residual AHI with 75% decrease or above constraints without REM?supine sleep at final pressure).    Movement Activity: Frequent periodic limb movements noted.  Periodic Limb Movements  During the diagnostic portion of the study, there were 48 PLMs recorded. The PLM index was 23.7 movements per hour. The PLM Arousal Index was 10.9 per hour.  During the treatment portion of the study, there were 247 PLMs recorded. The PLM index was 78.4 movements per hour. The PLM Arousal Index was 20.6 per hour.  REM EMG Activity - Excessive transient/sustained muscle activity was not present.  Nocturnal Behavior - Abnormal sleep related behaviors were not noted.  Bruxism - None apparent.    Cardiac Summary: Normal sinus rhythm and rates.  During the diagnostic portion of the study, the average pulse rate was 68.7 bpm. The minimum pulse rate was 53.0 bpm while the maximum pulse rate was 85.0 bpm.    During the treatment portion of the study, the average pulse rate was 70.4 bpm. The minimum pulse rate was 60.0 bpm while the maximum pulse rate was 87.0 bpm.     Arrhythmias were not noted.    Assessment:   Severe obstructive sleep apnea with AHI 73.4 events per hour and associated hypoxemia.  Poor sleep efficiency with increased arousal index and increased wake after sleep onset.  On CPAP decreased sleep efficient and increased arousal index persisted.  Final CPAP setting 16 significantly improved obstructive events; some central developed at this setting,  Frequent periodic limb movements noted.  Normal sinus rhythm and rates.    Recommendations:  Treatment options include Auto?titrating PAP therapy with a range of 10 cmH2O to 17 cmH2O with lateral sleep positioning, or repeat PSG with all night bi-level titration. Recommend clinical follow up with sleep  management team, including review of compliance measures.  Advice regarding the risks of drowsy driving.  Suggest optimizing sleep schedule and avoiding sleep deprivation.  Weight management (BMI > 30).  Pharmacologic therapy should be used for management of restless legs syndrome only if present and clinically indicated and not based on the presence of periodic limb movements alone.    Diagnostic Codes:   Obstructive Sleep Apnea G47.33  Sleep Hypoxemia G47.36     .   _____________________________________   Electronically Signed By: Kalani Padilla MD 1/29/2025

## 2025-01-30 LAB — SLPCOMP: NORMAL

## 2025-01-31 ENCOUNTER — OFFICE VISIT (OUTPATIENT)
Dept: PHARMACY | Facility: CLINIC | Age: 60
End: 2025-01-31
Payer: COMMERCIAL

## 2025-01-31 VITALS — DIASTOLIC BLOOD PRESSURE: 60 MMHG | SYSTOLIC BLOOD PRESSURE: 100 MMHG

## 2025-01-31 DIAGNOSIS — F33.1 MODERATE EPISODE OF RECURRENT MAJOR DEPRESSIVE DISORDER (H): ICD-10-CM

## 2025-01-31 DIAGNOSIS — M25.562 CHRONIC PAIN OF BOTH KNEES: ICD-10-CM

## 2025-01-31 DIAGNOSIS — M25.561 CHRONIC PAIN OF BOTH KNEES: ICD-10-CM

## 2025-01-31 DIAGNOSIS — E78.2 MIXED HYPERLIPIDEMIA: ICD-10-CM

## 2025-01-31 DIAGNOSIS — E03.9 ACQUIRED HYPOTHYROIDISM: ICD-10-CM

## 2025-01-31 DIAGNOSIS — G89.29 CHRONIC PAIN OF BOTH KNEES: ICD-10-CM

## 2025-01-31 DIAGNOSIS — E11.9 TYPE 2 DIABETES MELLITUS WITHOUT COMPLICATION, WITHOUT LONG-TERM CURRENT USE OF INSULIN (H): Primary | ICD-10-CM

## 2025-01-31 DIAGNOSIS — I10 PRIMARY HYPERTENSION: ICD-10-CM

## 2025-01-31 PROCEDURE — 99605 MTMS BY PHARM NP 15 MIN: CPT

## 2025-01-31 PROCEDURE — 99607 MTMS BY PHARM ADDL 15 MIN: CPT

## 2025-01-31 NOTE — PROGRESS NOTES
Medication Therapy Management (MTM) Encounter    ASSESSMENT:                            Medication Adherence/Access:  Patient seem to not remember some of his medications.      Diabetes Type II/weight loss: A1c slightly above goal of < 7%. Most of the CGM readings within range (70 - 180) 97% well above goal of > 70%. Considering low sugar readings in the morning reasonable to discontinue glipizide.  Patient has been losing weight, and reasonable to continue current medications. Will increase Zepbound dose in the future once patient wanted to lose more weight.     Hypertension: In clinic and home BP reasonably within goal of < 130/80. Will continue current medications as prescribed.     Mental Health   Anxiety, Depression, and Insomnia: Patient started to follow Psych, and recently increased dose of fluoxetine helping patient feel better. Will continue taking current medications.     Hyperlipidemia:Stable on atorvastatin 40 mg     Chronic Pain: Stable on current medications     Hypothyroidism:Stable on levothyroxine 150 mcg daily     PLAN:                            Discontinue glipizide due to some lower sugar   Advised patient to start doing exercise for better mood and energy     Follow-up: Due on 03/14/2025 @ 10 AM    SUBJECTIVE/OBJECTIVE:                          Jerson Lynn is a 59 year old male seen for a follow up visit. Professional Get[employer] was used (Name: Padmini ID: [id number])     Reason for visit: Medication Review and Diabetes Type II.     Allergies/ADRs: Reviewed in chart  Past Medical History: Reviewed in chart  Tobacco: He reports that he has never smoked. He has never been exposed to tobacco smoke. He has never used smokeless tobacco.  Alcohol: none  Medication Adherence/Access: Patient seem to not remember some of his medications.     Diabetes Type II:               Jardiance 25 mg daily - has not been taking               Zepbound 2.5 mg weekly               Glipizide XL 10 mg daily               Metformin  "1000 mg BID              Aspirin 81mg daily  Patient noted he have some fogginess;he is having some delayed thinking.  Current diabetes symptoms: Recently, noted he is getting tired at night and his sensor alarm  is going off at night.    Blood sugar monitoring: Check CGM below  Diet/Exercise: Patient noted   Eye exam is up to date  Foot exam: Due  Estimated body mass index is 37.15 kg/m  as calculated from the following:    Height as of 10/30/24: 5' 3\" (1.6 m).    Weight as of 10/30/24: 209 lb 11.2 oz (95.1 kg).     Lab Results   Component Value Date    A1C 7.2 10/30/2024    A1C 8.4 08/07/2024           Patient noted he has been losing weight.     Results  CGM - Dexcom: Dexcom G7  and Sensors $0  CGM - Freestyle Fabien: Fabien 3 Melba and Sensors $0  DPP-4 - Januvia: $0  DPP-4 - Onglyza:   DPP-4 - Tradjenta:   GLP-1 - Adlyxin:   GLP-1 - Bydureon:   GLP-1 - Byetta:   GLP-1 - Mounjaro:   GLP-1 - Ozempic: $0  GLP-1 - Rybelsus:   GLP-1 - Trulicity: PA Required  GLP-1 - Victoza: $0  Glucagon - Baqsimi:   Glucagon:     Soliqua:   Xultophy:   NPH Insulin - Humulin N:   NPH Insulin - Novolin N:   Rapid-acting Insulin - Admelog:   Rapid-acting Insulin - Apidra:   Rapid-acting Insulin - Fiasp:   Rapid-acting Insulin - Humalog:   Rapid-acting Insulin - Lyumjev:   Rapid-acting Insulin - Novolog:   SGLT-2 - Brenzavvy:   SGLT-2 - Farxiga:   SGLT-2 - Invokana:   SGLT-2 - Jardiance:   SGLT-2 - Steglatro:   Testing Supplies - AccuCheck:   Testing Supplies - Capri Contour:   Testing Supplies - OneTouch:   Weight Loss - Saxenda: $0 copay  Weight Loss - Wegovy: $0 copay  Weight Loss - Zepbound: $0 copay     Hypertension:              Hyzaar 100 - 25 mg daily   Patient reports no current medication side effects  Patient does not self-monitor blood pressure.  Patient asked for new BP cuff. Order sent to pharmacy.      BP Readings from Last 3 Encounters:   01/31/25 100/60   01/17/25 (!) 130/90   10/30/24 135/88     December: " "132/83, 129/92, 135/90, 133/88  January: 132/95, 129/85, 128/84, 132/93, 108/75, 120/85     Mental Health  Anxiety, Depression, and Insomnia:               Fluoxetine 40 mg daily   Patient reports no current medication side effects.   Previously, patient was active, but went through divorce and that has affected his physical and mental health. This has been going on for the last 10 years. Recently Psych increase fluoxetine dose from 40 to 60 mg and noted his mood improved and he is sleeping better. Generally, patient condition improved per his reports.     Hyperlipidemia    Atorvastatin 40 mg daily   Not side effects of muscle pain, but patient complained of having pain on his back up leg.  The 10-year ASCVD risk score (Janay LALA, et al., 2019) is: 8.8%    Values used to calculate the score:      Age: 59 years      Sex: Male      Is Non- : No      Diabetic: Yes      Tobacco smoker: No      Systolic Blood Pressure: 100 mmHg      Is BP treated: Yes      HDL Cholesterol: 32 mg/dL      Total Cholesterol: 131 mg/dL       Chronic Pain:   Naproxen 500 mg twice daily    Gabapentin 300 mg BID    Cyclobenzaprine 10 mg twice daily as needed for muscle cramping   Patient noted this medication helps for his pain.      Hypothyroidism    Levothyroxine 150 mcg daily   Patient is having the following symptoms: hypothyroidism -  myalgias, muscle cramps, fatigue, and weight gain and hyperthyroidism -  insomnia.  Patient's symptoms might not be due to the thyroid,but other conditions, but he has these related symptoms.   TSH   Date Value Ref Range Status   08/28/2024 2.59 0.30 - 4.20 uIU/mL Final     No results found for: \"T4\"      Today's Vitals: /60   ----------------      I spent 50 minutes with this patient today. All changes were made via collaborative practice agreement with Clemente Frey MD.     A summary of these recommendations was given to the patient.       Medication Therapy " Recommendations  Type 2 diabetes mellitus without complication, without long-term current use of insulin (H)   1 Current Medication: glipiZIDE (GLUCOTROL XL) 10 MG 24 hr tablet (Discontinued)   Current Medication Sig: Take 1 tablet (10 mg) by mouth daily.   Rationale: Undesirable effect - Adverse medication event - Safety   Recommendation: Discontinue Medication   Status: Accepted per CPA   Identified Date: 1/31/2025 Completed Date: 2/2/2025              Stefanie Funk, PharmD     Medication Therapy Management (MTM) Pharmacist     [phone]     [email or web address]

## 2025-01-31 NOTE — PATIENT INSTRUCTIONS
"Recommendations from today's MTM visit:                                                      MTM (medication therapy management) is a service provided by a clinical pharmacist designed to help you get the most of out of your medicines.   Today we reviewed what your medicines are for, how to know if they are working, that your medicines are safe and how to make your medicine regimen as easy as possible.      Discontinue glipizide due to some lower sugar   Advised patient to start doing exercise for better mood and energy     Follow-up: Due on 03/14/2025 @ 10 AM    It was great speaking with you today.  I value your experience and would be very thankful for your time in providing feedback in our clinic survey. In the next few days, you may receive an email or text message from [facility] [employer] with a link to a survey related to your  clinical pharmacist.\"     To schedule another MTM appointment, please call the clinic directly or you may call the MTM scheduling line at [phone].    My Clinical Pharmacist's contact information:                                                      Please feel free to contact me with any questions or concerns you have.        Stefanie Funk, PharmD     Medication Therapy Management (MTM) Pharmacist     [phone]     [email or web address]     [facility]  "

## 2025-02-04 DIAGNOSIS — E11.9 TYPE 2 DIABETES MELLITUS WITHOUT COMPLICATION, WITHOUT LONG-TERM CURRENT USE OF INSULIN (H): Primary | ICD-10-CM

## 2025-02-05 ENCOUNTER — PATIENT OUTREACH (OUTPATIENT)
Dept: CARE COORDINATION | Facility: CLINIC | Age: 60
End: 2025-02-05
Payer: COMMERCIAL

## 2025-02-05 RX ORDER — ASPIRIN 81 MG/1
TABLET, COATED ORAL
Qty: 90 TABLET | Refills: 3 | Status: SHIPPED | OUTPATIENT
Start: 2025-02-05

## 2025-02-05 NOTE — PROGRESS NOTES
Clinic Care Coordination Contact  Community Health Worker Follow Up    Care Gaps:     Health Maintenance Due   Topic Date Due    DIABETIC FOOT EXAM  Never done    ADVANCE CARE PLANNING  Never done    DEPRESSION ACTION PLAN  Never done    COLORECTAL CANCER SCREENING  Never done    HEPATITIS B IMMUNIZATION (1 of 3 - 19+ 3-dose series) Never done    DTAP/TDAP/TD IMMUNIZATION (3 - Tdap) 11/21/1987    ZOSTER IMMUNIZATION (1 of 2) Never done    A1C  01/30/2025    DEPRESSION 6 MO INDEX REPEAT PHQ-9  01/31/2025       Care Gaps Last addressed on 2/5/2025    Care Plan:   Care Plan: Financial Wellbeing       Problem: Patient expresses financial resource strain       Long-Range Goal: Apply for Social Security Disability.       Start Date: 10/18/2024 Expected End Date: 10/17/2025    This Visit's Progress: 30% Recent Progress: 20%    Priority: High    Note:     Barriers: long process, stressed, anxiety is high, language barrier.  Strengths: motivated, has some family support.  Patient expressed understanding of goal: yes  Action steps to achieve this goal:  1. I will work with Disability Specialists to see if I can apply for Disability through Social Security.   2. I will complete application process.   3. I will report progress towards this goal at scheduled outreach telephone calls from the CCC team.    2/5 discussed, pt received call from [employer] to schedule an evaluation with their SW/Medical team- no appt scheduled at this time  1/6 discussed, paperwork has been submitted, no update at this time  11/22- discussed with patient, states he has started the papework                               Care Plan: Mental Health       Problem: Mental Health Symptoms Need Improvement       Long-Range Goal: Improve management of mental health symptoms and establish with mental health/psychosocial supports       Start Date: 10/18/2024 Expected End Date: 10/17/2025    This Visit's Progress: 40% Recent Progress: 30%    Priority: High     Note:     Barriers: very stressed, anxiety.  Strengths: motivated, engaging with care team, support from daughter.  Patient expressed understanding of goal: yes  Action steps to achieve this goal:  1. I will work with [facility] to get support from therapy, and medication management.  2. I will work with [facility] program and will learn new skills.  3. I will report progress towards this goal at scheduled outreach telephone calls from the [facility] team.    2/5 discussed, next appt scheduled in February 1/6/25- discussed, next appt on 1/10/25  11/22- discussed with patient, has appt scheduled on 12/19 at 1:00 pm                                Intervention and Education during outreach: Supportive Listening and CHW scheduled follow up CC SW call with patient to discuss parking placard 2/10/2025    CHW spoke to Jerson today for monthly CC outreach call, patient states he is doing well however is still feeling tried and weak at times (Christacoretta believes this may be from diabetes medication)  Patient continues to check blood sugars daily, he states blood sugars are right around 200.  CC goals were discussed during today's call, Jerson has submitted paperwork for Social Security Disability, he reports that he was contacted by them to schedule evaluation with their SW/Medical Staff- no appt scheduled at this time.  Jerson continues to see therapist on regular basis without concern.    CHW did schedule follow up appt with CC SW Celi on Monday 2/10/2025 at 10:00 am to discuss Handicapped Parking Placard    Next PCP appt scheduled 3/27/2025      CHW Plan: Continue with monthly outreach call to discuss, support and update CC goal progression    Next CHW outreach call: 3/5/2025    Vinita BOYLE  Community Health Worker  [facility] Care Coordination  [facility] Dominique [email or web address]  [email or web address]  Office: [phone]

## 2025-02-05 NOTE — TELEPHONE ENCOUNTER
Spoke with patient via  services. Patient states he would like this sent in as a prescription if possible but does confirm he is taking it

## 2025-02-10 ENCOUNTER — PATIENT OUTREACH (OUTPATIENT)
Dept: NURSING | Facility: CLINIC | Age: 60
End: 2025-02-10
Payer: COMMERCIAL

## 2025-02-10 NOTE — PROGRESS NOTES
Clinic Care Coordination Contact  Follow Up Progress Note      Assessment: patient has pain in his knees and would like to get a handicapped parking certificate. His daughter is usually driving him to appts now.  He has gotten better sleep due to a change in his medications.      Care Gaps:    Health Maintenance Due   Topic Date Due    DIABETIC FOOT EXAM  Never done    ADVANCE CARE PLANNING  Never done    DEPRESSION ACTION PLAN  Never done    COLORECTAL CANCER SCREENING  Never done    HEPATITIS B IMMUNIZATION (1 of 3 - 19+ 3-dose series) Never done    DTAP/TDAP/TD IMMUNIZATION (3 - Tdap) 11/21/1987    ZOSTER IMMUNIZATION (1 of 2) Never done    A1C  01/30/2025    DEPRESSION 6 MO INDEX REPEAT PHQ-9  01/31/2025       Postponed to next pcp appt     Care Plans  Care Plan: Financial Wellbeing       Problem: Patient expresses financial resource strain       Long-Range Goal: Apply for Social Security Disability.       Start Date: 10/18/2024 Expected End Date: 10/17/2025    This Visit's Progress: 30% Recent Progress: 20%    Priority: High    Note:     Barriers: long process, stressed, anxiety is high, language barrier.  Strengths: motivated, has some family support.  Patient expressed understanding of goal: yes  Action steps to achieve this goal:  1. I will work with Disability Specialists to see if I can apply for Disability through Social Security.   2. I will complete application process.   3. I will report progress towards this goal at scheduled outreach telephone calls from the [facility] team.    2/5 discussed, pt received call from [employer] to schedule an evaluation with their SW/Medical team- no appt scheduled at this time  1/6 discussed, paperwork has been submitted, no update at this time  11/22- discussed with patient, states he has started the papework                               Care Plan: Mental Health       Problem: Mental Health Symptoms Need Improvement       Long-Range Goal: Improve management of mental  health symptoms and establish with mental health/psychosocial supports       Start Date: 10/18/2024 Expected End Date: 10/17/2025    This Visit's Progress: 40% Recent Progress: 30%    Priority: High    Note:     Barriers: very stressed, anxiety.  Strengths: motivated, engaging with care team, support from daughter.  Patient expressed understanding of goal: yes  Action steps to achieve this goal:  1. I will work with [facility] to get support from therapy, and medication management.  2. I will work with [facility] program and will learn new skills.  3. I will report progress towards this goal at scheduled outreach telephone calls from the [facility] team.    2/5 discussed, next appt scheduled in February 1/6/25- discussed, next appt on 1/10/25  11/22- discussed with patient, has appt scheduled on 12/19 at 1:00 pm                              Care Plan: General       Problem: HP GENERAL PROBLEM       Goal: I will work with my PCP to see if I can get a handicapped parking card.       Start Date: 2/10/2025 Expected End Date: 7/1/2025    Priority: High    Note:     Barriers: none noted.   Strengths: motivated.  Patient expressed understanding of goal: yes  Action steps to achieve this goal:  1. I will work with my PCP to answer any questions.   2. I will look for mail from [facility].  3. I will report progress towards this goal at scheduled outreach telephone calls from the CCC team.                                  Intervention/Education provided during outreach: added goal.      Outreach Frequency: monthly, more frequently as needed    Plan:   [facility] SW will continue to monitor, support patient with current goals and will be available to assist as needs arise. [facility] CHW will reach out to patient on a monthly basis to discuss progression of goals.      [facility] SW will perform Chart Review in 45 days.   Celi Hernandez,   [facility]  [phone]

## 2025-02-11 ENCOUNTER — TELEPHONE (OUTPATIENT)
Dept: INTERNAL MEDICINE | Facility: CLINIC | Age: 60
End: 2025-02-11

## 2025-02-11 ENCOUNTER — OFFICE VISIT (OUTPATIENT)
Dept: ORTHOPEDICS | Facility: CLINIC | Age: 60
End: 2025-02-11
Payer: COMMERCIAL

## 2025-02-11 DIAGNOSIS — M25.561 CHRONIC PAIN OF BOTH KNEES: ICD-10-CM

## 2025-02-11 DIAGNOSIS — M25.562 CHRONIC PAIN OF BOTH KNEES: ICD-10-CM

## 2025-02-11 DIAGNOSIS — G89.29 CHRONIC PAIN OF BOTH KNEES: ICD-10-CM

## 2025-02-11 DIAGNOSIS — M19.042 ARTHRITIS OF BOTH HANDS: ICD-10-CM

## 2025-02-11 DIAGNOSIS — M17.0 BILATERAL PRIMARY OSTEOARTHRITIS OF KNEE: Primary | ICD-10-CM

## 2025-02-11 DIAGNOSIS — M19.041 ARTHRITIS OF BOTH HANDS: ICD-10-CM

## 2025-02-11 PROCEDURE — 99213 OFFICE O/P EST LOW 20 MIN: CPT | Performed by: STUDENT IN AN ORGANIZED HEALTH CARE EDUCATION/TRAINING PROGRAM

## 2025-02-11 NOTE — TELEPHONE ENCOUNTER
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  Patient label was attached to paperwork and placed in provider's inbox to be signed.    Liliana Angelo

## 2025-02-11 NOTE — LETTER
2/11/2025      Jerson Lynn  [address] [place] [address]      Dear Colleague,    Thank you for referring your patient, Jerson Lynn, to the [facility] SP[facility] [place]. Please see a copy of my visit note below.    SPORTS MEDICINE CLINIC FOLLOW-UP PATIENT VISIT    HISTORY OF PRESENT ILLNESS  Jerson Lynn is a very pleasant 59 year old male with DMII (Hgb A1C 8.4 on 8/7/2024) who is presenting today with left worse than right bilateral knee pain and left knee subjective instability that seems related to osteoarthritis. Clinical assessment reveals clinical ligamentous stability without any radiographic findings of fracture.  It is also reassuring that he has no focal neurologic findings to suggest any lumbosacral radiculopathy.    Previous Visit (12/6/2024)   At last visit, he completed US-guided  bilateral knee Synvisc One injections (after prior visits with bilateral knee CSI)    Today (2/11/2025)  Since last visit, significantly improved since previous visit. There is mild pain currently but his function is not effected by this level of pain. He continues physical therapy and this has been beneficial for his strength and function. He believes the improvements are derived from the injections and not the physical therapy.       He also reports bilateral hand IP joint pain and wonders what to do for that.    Previous Treatments:  -Rehabilitation: 5 PT visits Aug-Sep 2024  -Durable Medical Equipment:No  -Injections: palpation-guided corticosteroid injection around April 2024 only lasted 2 weeks  -Modalities:   -Other Providers seen: Karmen Stock DNP ([facility])  - medications: tylenol and advil.    Sports/hobbies/activities:  - not exercising due to knee pain  - walks on treadmill    Average hours of sleep per night: 4 hours.  Yes, any small movement is painful, he needs to use a cane to go to the bathroom.    Average minutes of exercise per day: Daily 10-30 mins depending on  knee pain    Area of Problem  10/22/24  2/11/2025 Date 3 Date 4 Date 5   Function Ability in last week - % of Baseline (0 is worst & 100 is best) 10%  75%      Sport/Activity Ability in last week - % of Baseline (0 is worst & 100 is best) N/A  N/A      Pain Level in the last week (0 is best & 10 is worst)  7/10 to 8/10  2-3/10      **Cannot work, cannot carry things, impacts daily activity    Additional Information of consideration:  -Poor Balance? Yes, due to some left knee pain-associated buckling, denies any fall  -Numbness/paresthesias in extremities?No    MEDICATIONS    Current Outpatient Medications:      diclofenac (VOLTAREN) 1 % topical gel, Apply 2 g topically 4 times daily as needed for moderate pain. Apply thin layer to knuckles and knees, Disp: 150 g, Rfl: 5     aspirin (ASPIRIN LOW DOSE) 81 MG EC tablet, TAKE 1 PILL BY MOUTH EVERY DAY / TXHUA HNUB NOJ 1 LUB TSHUAJ PAB KOM [facility] ZOO, Disp: 90 tablet, Rfl: 3     atorvastatin (LIPITOR) 40 MG tablet, TAKE 1 PILL BY MOUTH EVERY NIGHT AT BEDTIME FOR HIGH CHOLESTEROL / TXHUA HMO NOJ 1 LUB [facility] THAUM MUS PW PAB [facility] ROJ, Disp: , Rfl:      Blood Pressure Monitoring (COMFORT TOUCH BP CUFF/MEDIUM) MISC, 1 each daily., Disp: 1 each, Rfl: 0     colchicine (COLCRYS) 0.6 MG tablet, Take 1 tablet (0.6 mg) by mouth daily as needed for gout pain., Disp: 30 tablet, Rfl: 0     Continuous Glucose Sensor (FREESTYLE ALEC 3 PLUS SENSOR) MISC, Change every 15 days., Disp: 6 each, Rfl: 11     Continuous Glucose Sensor (FREESTYLE ALEC 3 SENSOR) MISC, Change every 14 days., Disp: 6 each, Rfl: 5     cyclobenzaprine (FLEXERIL) 10 MG tablet, Take 1 tablet twice a day by oral route as needed for 15 days, for muscle cramping., Disp: , Rfl:      empagliflozin (JARDIANCE) 25 MG TABS tablet, Take 1 tablet (25 mg) by mouth daily., Disp: 90 tablet, Rfl: 3     FLUoxetine (PROZAC) 20 MG capsule, Take 1 capsule (20mg) along with other fluoxetine prescription (40mg) totaling  "60mg in the morning. Noj 1 ntsiav tshuaj (20mg) nrog krissy lwm cov tshuaj fluoxetine (40mg) tag nrho 60mg thaum sawv ntxov., Disp: 30 capsule, Rfl: 1     FLUoxetine (PROZAC) 40 MG capsule, Take 1 capsule (40 mg) by mouth daily. Noj 1 capsule (40 mg) ntawm qhov ncauj txhua hnub., Disp: 30 capsule, Rfl: 1     gabapentin (NEURONTIN) 300 MG capsule, Take 1 capsule twice a day by oral route as needed for 30 days, for nerve pain., Disp: , Rfl:      levothyroxine (SYNTHROID/LEVOTHROID) 150 MCG tablet, TAKE 1 PILL BY MOUTH EVERY DAY FOR HYPOTHYROIDISM/ NOJ IB LUB IB HNUB PAB KRISSY LUB THYROID, Disp: , Rfl:      losartan-hydrochlorothiazide (HYZAAR) 100-25 MG tablet, TAKE 1 PILL BY MOUTH DAILY FOR BLOOD PRESSURE / TXHUA HNUB NOJ 1 LUB TSHUAJ [facility] KRISSY NTSHAV SIAB, Disp: , Rfl:      lurasidone (LATUDA) 20 MG TABS tablet, Take 1 tablet (20 mg) by mouth daily with food. Noj ib ntsiav tshuaj txhua hnub nrog zaub mov uas muaj tsawg kawg yog 300 calorie ntau ntau., Disp: 30 tablet, Rfl: 1     metFORMIN (GLUCOPHAGE) 1000 MG tablet, TAKE 1 PILL BY MOUTH TWO TIMES A DAY WITH MORNING AND EVENING MEAL/ TXHUA HNUB NOJ 1 LUB 2 ZAUG NROG TSHIAS THIAB NROG [facility] NTSHAV QAB ZIB, Disp: , Rfl:      naproxen (NAPROSYN) 500 MG tablet, TAKE 1 TABLET BY MOUTH TWICE A DAY AS NEEDED FOR BILATERAL LEG PAIN FOR 15 DAYS, Disp: , Rfl:      prazosin (MINIPRESS) 2 MG capsule, Take 1 capsule (2 mg) by mouth at bedtime. Noj ib ntsiav tshuaj (2mg) thaum pw., Disp: 30 capsule, Rfl: 1     Transparent Dressings (TEGADERM I.V. 2\"X2-1/4\") MISC, 1 each every 14 days., Disp: 30 each, Rfl: 4     ZEPBOUND 2.5 MG/0.5ML prefilled pen, INJECT 0.5 MLS (2.5 MG) SUBCUTANEOUSLY EVERY 7 DAYS., Disp: 2 mL, Rfl: 1    ALLERGIES  No Known Allergies    PAST MEDICAL HISTORY  No past medical history on file.    PAST SURGICAL HISTORY  No past surgical history on file.    SOCIAL HISTORY  Social History     Tobacco Use     Smoking status: Never     Passive exposure: Never     " "Smokeless tobacco: Never   Vaping Use     Vaping status: Never Used       FAMILY HISTORY  No family history on file.    REVIEW OF SYSTEMS  Complete 12 system Review of Systems performed and was negative except for HPI.    VITALS  There were no vitals filed for this visit.    PHYSICAL EXAMINATION   General: Age appropriate appearing, no acute distress  HEENT: normocephalic, atraumatic, sclera non-icteric  Skin: No open skin lesion noted in visible areas.  Respiratory: Non labored breathing, No wheezes.  Cardiac/Vessels: No edema, cyanosis, clubbing noted in all extremities.  Lymph: No palpable lymph node swelling noted around the affected area.  Mental: There was no signs of aberrant behaviors noted. Patient was pleasant throughout the encounter.    Functional Movements: Non-antalgic gait    IMAGING STUDIES:  10/22/24 Bilateral knee radiographs: \"IMPRESSION: LEFT KNEE: Mild-to-moderate medial and patellofemoral compartment degenerative arthrosis. Minimal joint effusion. No displaced fracture.  RIGHT KNEE: Mild medial and patellofemoral compartment degenerative arthrosis. No sizable joint effusion. No displaced fracture.\"    10/17/2024 Bilateral hand radiographs: \"Mild degenerative arthritic joint space narrowing and spurring in the IP joints of the fingers. Otherwise negative. Normal joint alignment. No erosive change. No fracture. \"    I personally reviewed these images and agree with the radiology report and shared the findings with the patient.    IMPRESSION  Jerson Lynn is a very pleasant 59 year old male with DMII (Hgb A1C 8.4 on 8/7/2024) who is presenting today for follow-up left worse than right bilateral knee pain and left knee subjective instability that seems related to osteoarthritis.  He is s/p US-guided  bilateral knee Synvisc One injections in our office on 12/6/2024 after prior bilateral CSI injections.  Regarding his hand pain, he has bilateral hand arthritis supported via radiographic " assessment.    PLAN  The following was discussed with the patient:  Activity Modification: Activity as tolerated  Imaging/Tests: Prior radiographs reviewed  Rehabilitation: Continue physical therapy for knees.  Occupational therapy referral placed for hands.  Orthotics/Bracing:  No changes  Medication: Non-prescription topical and/or oral analgesics may be used as needed  Interventions: None recommended at this time  Follow-up Plan: As needed  Resources Provided: Written and verbal information detailing above findings and plan provided including after visit summary and arthritis education materials in [employer]    They were encouraged to message me on [employer] whenever they needed.    The patient was in agreement with this plan. All questions were answered to the best of my ability.    Total time (face-to-face and non-face-to-face) spent on today's visit was 20 minutes. This included preparation for the visit (i.e. reviewing test results), performance of a medically appropriate history and examination, placing orders for medications, tests or other procedures, and discussing the plan of care with the patient. This time is exclusive of procedures performed and time spent teaching.    Corrie Castellon MD, [facility]  Sports Medicine Attending Physician  Department of Physical Medicine & Rehabilitation         Again, thank you for allowing me to participate in the care of your patient.        Sincerely,        Corrie Castellon MD    Electronically signed

## 2025-02-11 NOTE — PATIENT INSTRUCTIONS
Jerson Lynn, It was nice to see you in our office today.      DIAGNOSIS:   1. Bilateral primary osteoarthritis of knee    2. Chronic pain of both knees    3. Arthritis of both hands      INSTRUCTIONS FOR FOLLOW-UP CARE:  Activity Modification: As tolerated  Imaging/Tests: No new imaging  Rehabilitation: Occupational therapy referral placed for hands  Orthotics/Bracing: No changes  Medication: No changes  Follow-up Plan: As needed if symptoms persist/worsen after OT    CLINIC LOCATIONS:     [place] [facility]:  [phone] [address], [address] [employer] [employer] HELP LINE: [phone]   [place] [address] TRIAGE LINE: [phone]   (Monday & Friday) APPOINTMENTS: [phone]    RADIOLOGY: [phone]   [place] [facility] [phone] [address] PHYSICAL & OCCUPATIONAL THERAPY: [phone]*   [place] [address] BILLING QUESTIONS: [phone]   (Tuesday & Thursday) FAX: [phone]       *Therapy locations that offer Saturday options: [place], [place], [place]    Thank you for choosing [facility]!    If you have any questions, please do not hesitate to reach out on [employer] or Call [phone] and ask for my team.    Corrie Castellon MD, [facility] and Sports Medicine

## 2025-02-11 NOTE — PROGRESS NOTES
SPORTS MEDICINE CLINIC FOLLOW-UP PATIENT VISIT    HISTORY OF PRESENT ILLNESS  Jerson Lynn is a very pleasant 59 year old male with DMII (Hgb A1C 8.4 on 8/7/2024) who is presenting today with left worse than right bilateral knee pain and left knee subjective instability that seems related to osteoarthritis. Clinical assessment reveals clinical ligamentous stability without any radiographic findings of fracture.  It is also reassuring that he has no focal neurologic findings to suggest any lumbosacral radiculopathy.    Previous Visit (12/6/2024)   At last visit, he completed US-guided  bilateral knee Synvisc One injections (after prior visits with bilateral knee CSI)    Today (2/11/2025)  Since last visit, significantly improved since previous visit. There is mild pain currently but his function is not effected by this level of pain. He continues physical therapy and this has been beneficial for his strength and function. He believes the improvements are derived from the injections and not the physical therapy.       He also reports bilateral hand IP joint pain and wonders what to do for that.    Previous Treatments:  -Rehabilitation: 5 PT visits Aug-Sep 2024  -Durable Medical Equipment:No  -Injections: palpation-guided corticosteroid injection around April 2024 only lasted 2 weeks  -Modalities:   -Other Providers seen: Karmen Stcok DNP ([facility])  - medications: tylenol and advil.    Sports/hobbies/activities:  - not exercising due to knee pain  - walks on treadmill    Average hours of sleep per night: 4 hours.  Yes, any small movement is painful, he needs to use a cane to go to the bathroom.    Average minutes of exercise per day: Daily 10-30 mins depending on knee pain    Area of Problem  10/22/24  2/11/2025 Date 3 Date 4 Date 5   Function Ability in last week - % of Baseline (0 is worst & 100 is best) 10%  75%      Sport/Activity Ability in last week - % of Baseline (0 is worst & 100 is best)  N/A  N/A      Pain Level in the last week (0 is best & 10 is worst)  7/10 to 8/10  2-3/10      **Cannot work, cannot carry things, impacts daily activity    Additional Information of consideration:  -Poor Balance? Yes, due to some left knee pain-associated buckling, denies any fall  -Numbness/paresthesias in extremities?No    MEDICATIONS    Current Outpatient Medications:     diclofenac (VOLTAREN) 1 % topical gel, Apply 2 g topically 4 times daily as needed for moderate pain. Apply thin layer to knuckles and knees, Disp: 150 g, Rfl: 5    aspirin (ASPIRIN LOW DOSE) 81 MG EC tablet, TAKE 1 PILL BY MOUTH EVERY DAY / TXHUA HNUB NOJ 1 LUB TSHUAJ PAB KOM NTSHAV KHIAV ZOO, Disp: 90 tablet, Rfl: 3    atorvastatin (LIPITOR) 40 MG tablet, TAKE 1 PILL BY MOUTH EVERY NIGHT AT BEDTIME FOR HIGH CHOLESTEROL / TXHUA HMO NOJ 1 LUB TSHUAJ THAUM MUS PW PAB NTSHAV MUAJ ROJ, Disp: , Rfl:     Blood Pressure Monitoring (COMFORT TOUCH BP CUFF/MEDIUM) MISC, 1 each daily., Disp: 1 each, Rfl: 0    colchicine (COLCRYS) 0.6 MG tablet, Take 1 tablet (0.6 mg) by mouth daily as needed for gout pain., Disp: 30 tablet, Rfl: 0    Continuous Glucose Sensor (FREESTYLE ALEC 3 PLUS SENSOR) MISC, Change every 15 days., Disp: 6 each, Rfl: 11    Continuous Glucose Sensor (FREESTYLE ALEC 3 SENSOR) MISC, Change every 14 days., Disp: 6 each, Rfl: 5    cyclobenzaprine (FLEXERIL) 10 MG tablet, Take 1 tablet twice a day by oral route as needed for 15 days, for muscle cramping., Disp: , Rfl:     empagliflozin (JARDIANCE) 25 MG TABS tablet, Take 1 tablet (25 mg) by mouth daily., Disp: 90 tablet, Rfl: 3    FLUoxetine (PROZAC) 20 MG capsule, Take 1 capsule (20mg) along with other fluoxetine prescription (40mg) totaling 60mg in the morning. Noj 1 ntsiav tshuaj (20mg) nrog krissy lwm cov tshuaj fluoxetine (40mg) tag nrho 60mg thaum sawv ntxov., Disp: 30 capsule, Rfl: 1    FLUoxetine (PROZAC) 40 MG capsule, Take 1 capsule (40 mg) by mouth daily. Noj 1 capsule (40 mg)  "ntawm qhov ncauj txhua hnub., Disp: 30 capsule, Rfl: 1    gabapentin (NEURONTIN) 300 MG capsule, Take 1 capsule twice a day by oral route as needed for 30 days, for nerve pain., Disp: , Rfl:     levothyroxine (SYNTHROID/LEVOTHROID) 150 MCG tablet, TAKE 1 PILL BY MOUTH EVERY DAY FOR HYPOTHYROIDISM/ NOJ IB LUB IB HNUB PAB MADELINE LUB THYROID, Disp: , Rfl:     losartan-hydrochlorothiazide (HYZAAR) 100-25 MG tablet, TAKE 1 PILL BY MOUTH DAILY FOR BLOOD PRESSURE / TXHUA HNUB NOJ 1 LUB TSHUAJ PAB ZOO MADELINE NTSHAV SIAB, Disp: , Rfl:     lurasidone (LATUDA) 20 MG TABS tablet, Take 1 tablet (20 mg) by mouth daily with food. Noj ib ntsiav tshuaj txhua hnub nrog zaub mov uas muaj tsawg kawg yog 300 calorie ntau ntau., Disp: 30 tablet, Rfl: 1    metFORMIN (GLUCOPHAGE) 1000 MG tablet, TAKE 1 PILL BY MOUTH TWO TIMES A DAY WITH MORNING AND EVENING MEAL/ TXHUA HNUB NOJ 1 LUB 2 ZAUG NROG TSHIAS THIAB NROG HMO PAB ZOO NTSHAV QAB ZIB, Disp: , Rfl:     naproxen (NAPROSYN) 500 MG tablet, TAKE 1 TABLET BY MOUTH TWICE A DAY AS NEEDED FOR BILATERAL LEG PAIN FOR 15 DAYS, Disp: , Rfl:     prazosin (MINIPRESS) 2 MG capsule, Take 1 capsule (2 mg) by mouth at bedtime. Noj ib ntsiav tshuaj (2mg) thaum pw., Disp: 30 capsule, Rfl: 1    Transparent Dressings (TEGADERM I.V. 2\"X2-1/4\") MISC, 1 each every 14 days., Disp: 30 each, Rfl: 4    ZEPBOUND 2.5 MG/0.5ML prefilled pen, INJECT 0.5 MLS (2.5 MG) SUBCUTANEOUSLY EVERY 7 DAYS., Disp: 2 mL, Rfl: 1    ALLERGIES  No Known Allergies    PAST MEDICAL HISTORY  No past medical history on file.    PAST SURGICAL HISTORY  No past surgical history on file.    SOCIAL HISTORY  Social History     Tobacco Use    Smoking status: Never     Passive exposure: Never    Smokeless tobacco: Never   Vaping Use    Vaping status: Never Used       FAMILY HISTORY  No family history on file.    REVIEW OF SYSTEMS  Complete 12 system Review of Systems performed and was negative except for HPI.    VITALS  There were no vitals filed for " "this visit.    PHYSICAL EXAMINATION   General: Age appropriate appearing, no acute distress  HEENT: normocephalic, atraumatic, sclera non-icteric  Skin: No open skin lesion noted in visible areas.  Respiratory: Non labored breathing, No wheezes.  Cardiac/Vessels: No edema, cyanosis, clubbing noted in all extremities.  Lymph: No palpable lymph node swelling noted around the affected area.  Mental: There was no signs of aberrant behaviors noted. Patient was pleasant throughout the encounter.    Functional Movements: Non-antalgic gait    IMAGING STUDIES:  10/22/24 Bilateral knee radiographs: \"IMPRESSION: LEFT KNEE: Mild-to-moderate medial and patellofemoral compartment degenerative arthrosis. Minimal joint effusion. No displaced fracture.  RIGHT KNEE: Mild medial and patellofemoral compartment degenerative arthrosis. No sizable joint effusion. No displaced fracture.\"    10/17/2024 Bilateral hand radiographs: \"Mild degenerative arthritic joint space narrowing and spurring in the IP joints of the fingers. Otherwise negative. Normal joint alignment. No erosive change. No fracture. \"    I personally reviewed these images and agree with the radiology report and shared the findings with the patient.    IMPRESSION  Jerson Lynn is a very pleasant 59 year old male with DMII (Hgb A1C 8.4 on 8/7/2024) who is presenting today for follow-up left worse than right bilateral knee pain and left knee subjective instability that seems related to osteoarthritis.  He is s/p US-guided  bilateral knee Synvisc One injections in our office on 12/6/2024 after prior bilateral CSI injections.  Regarding his hand pain, he has bilateral hand arthritis supported via radiographic assessment.    PLAN  The following was discussed with the patient:  Activity Modification: Activity as tolerated  Imaging/Tests: Prior radiographs reviewed  Rehabilitation: Continue physical therapy for knees.  Occupational therapy referral placed for hands.  Orthotics/Bracing: "  No changes  Medication: Non-prescription topical and/or oral analgesics may be used as needed  Interventions: None recommended at this time  Follow-up Plan: As needed  Resources Provided: Written and verbal information detailing above findings and plan provided including after visit summary and arthritis education materials in [employer]    They were encouraged to message me on [employer] whenever they needed.    The patient was in agreement with this plan. All questions were answered to the best of my ability.    Total time (face-to-face and non-face-to-face) spent on today's visit was 20 minutes. This included preparation for the visit (i.e. reviewing test results), performance of a medically appropriate history and examination, placing orders for medications, tests or other procedures, and discussing the plan of care with the patient. This time is exclusive of procedures performed and time spent teaching.    Corrie Castellon MD, [facility]  Sports Medicine Attending Physician  Department of Physical Medicine & Rehabilitation

## 2025-02-13 ENCOUNTER — APPOINTMENT (OUTPATIENT)
Dept: INTERPRETER SERVICES | Facility: CLINIC | Age: 60
End: 2025-02-13
Payer: COMMERCIAL

## 2025-02-15 ENCOUNTER — HEALTH MAINTENANCE LETTER (OUTPATIENT)
Age: 60
End: 2025-02-15

## 2025-03-05 ENCOUNTER — PATIENT OUTREACH (OUTPATIENT)
Dept: CARE COORDINATION | Facility: CLINIC | Age: 60
End: 2025-03-05
Payer: COMMERCIAL

## 2025-03-05 NOTE — PROGRESS NOTES
Clinic Care Coordination Contact  Community Health Worker Follow Up    Care Gaps:     Health Maintenance Due   Topic Date Due    DIABETIC FOOT EXAM  Never done    ADVANCE CARE PLANNING  Never done    DEPRESSION ACTION PLAN  Never done    COLORECTAL CANCER SCREENING  Never done    HEPATITIS B IMMUNIZATION (1 of 3 - 19+ 3-dose series) Never done    DTAP/TDAP/TD IMMUNIZATION (3 - Tdap) 11/21/1987    ZOSTER IMMUNIZATION (1 of 2) Never done    A1C  01/30/2025    DEPRESSION 6 MO INDEX REPEAT PHQ-9  01/31/2025       Care Gaps Last addressed on 3/5/2025    Care Plan:   Care Plan: Financial Wellbeing       Problem: Patient expresses financial resource strain       Long-Range Goal: Apply for Social Security Disability.       Start Date: 10/18/2024 Expected End Date: 10/17/2025    This Visit's Progress: 40% Recent Progress: 30%    Priority: High    Note:     Barriers: long process, stressed, anxiety is high, language barrier.  Strengths: motivated, has some family support.  Patient expressed understanding of goal: yes  Action steps to achieve this goal:  1. I will work with Disability [employer]ists to see if I can apply for Disability through Social Security.   2. I will complete application process.   3. I will report progress towards this goal at scheduled outreach telephone calls from the CCC team.    3/5- discussed, patient has evaluation scheduled 3/10 and follow up 3/25  2/5 discussed, pt received call from [employer] to schedule an evaluation with their SW/Medical team- no appt scheduled at this time  1/6 discussed, paperwork has been submitted, no update at this time  11/22- discussed with patient, states he has started the papework                               Care Plan: Mental Health       Problem: Mental Health Symptoms Need Improvement       Long-Range Goal: Improve management of mental health symptoms and establish with mental health/psychosocial supports       Start Date: 10/18/2024 Expected End Date:  10/17/2025    Recent Progress: 40%    Priority: High    Note:     Barriers: very stressed, anxiety.  Strengths: motivated, engaging with care team, support from daughter.  Patient expressed understanding of goal: yes  Action steps to achieve this goal:  1. I will work with [facility] to get support from therapy, and medication management.  2. I will work with [employer] [facility] program and will learn new skills.  3. I will report progress towards this goal at scheduled outreach telephone calls from the [facility] team.    3/5- discussed has follow up call later in month  2/5 discussed, next appt scheduled in February 1/6/25- discussed, next appt on 1/10/25  11/22- discussed with patient, has appt scheduled on 12/19 at 1:00 pm                              Care Plan: General       Problem: HP GENERAL PROBLEM       Goal: I will work with my PCP to see if I can get a handicapped parking card.       Start Date: 2/10/2025 Expected End Date: 7/1/2025    This Visit's Progress: 10%    Priority: High    Note:     Barriers: none noted.   Strengths: motivated.  Patient expressed understanding of goal: yes  Action steps to achieve this goal:  1. I will work with my PCP to answer any questions.   2. I will look for mail from [facility].  3. I will report progress towards this goal at scheduled outreach telephone calls from the CCC team.    3/5- per chart review was sent to Dr. Castellon on 2/19- per patient he still does not have placard- CHW will send message to Dr. Castellon today to see if there is any update                              Intervention and Education during outreach:   Supportive Listening, CHW will reach out to find update on parking placautumn, Dr Castellon faxed form back 2/19/2025.    CHW spoke to Jerson today for monthly CC outreach call, patient states he is doing well however continues to be weak.  CC goals were discussed and progression was updated during today's call.  Patient has upcoming appointment on 3/10/2025 for social security  evaluation scheduled.  He continues to speak with [facility] worker/therapist next appointment is at the end of the month per Jerson.  No update on parking placard at this time, per chart review paperwork was sent via fax to Dr. Castellon on 2/18 and completed/signed paperwork was received on 2/19/2025- CHW will reach out to [facility] staff to see if they have update or if assistance is needed.  Jerson very thankful for outreach call today.    Next PCP appt: 3/26/2025 at 4:00 pm    CHW Plan: Continue with monthly outreach call to discuss, support and update CC goal progression    Next CHW outreach call: 4/4/2025    Vinita BOYLE  Community Health Worker  [facility] Coordination  [email or web address]  Office: [phone]

## 2025-03-14 ENCOUNTER — OFFICE VISIT (OUTPATIENT)
Dept: PHARMACY | Facility: CLINIC | Age: 60
End: 2025-03-14
Payer: COMMERCIAL

## 2025-03-14 VITALS — BODY MASS INDEX: 32.17 KG/M2 | DIASTOLIC BLOOD PRESSURE: 80 MMHG | WEIGHT: 181.6 LBS | SYSTOLIC BLOOD PRESSURE: 122 MMHG

## 2025-03-14 DIAGNOSIS — I10 PRIMARY HYPERTENSION: ICD-10-CM

## 2025-03-14 DIAGNOSIS — R39.14 BENIGN PROSTATIC HYPERPLASIA WITH INCOMPLETE BLADDER EMPTYING: ICD-10-CM

## 2025-03-14 DIAGNOSIS — F33.1 MODERATE EPISODE OF RECURRENT MAJOR DEPRESSIVE DISORDER (H): ICD-10-CM

## 2025-03-14 DIAGNOSIS — N52.2 DRUG-INDUCED ERECTILE DYSFUNCTION: ICD-10-CM

## 2025-03-14 DIAGNOSIS — N40.1 BENIGN PROSTATIC HYPERPLASIA WITH INCOMPLETE BLADDER EMPTYING: ICD-10-CM

## 2025-03-14 DIAGNOSIS — F41.9 ANXIETY: ICD-10-CM

## 2025-03-14 DIAGNOSIS — E11.9 TYPE 2 DIABETES MELLITUS WITHOUT COMPLICATION, WITHOUT LONG-TERM CURRENT USE OF INSULIN (H): Primary | ICD-10-CM

## 2025-03-14 PROCEDURE — 3074F SYST BP LT 130 MM HG: CPT

## 2025-03-14 PROCEDURE — 3079F DIAST BP 80-89 MM HG: CPT

## 2025-03-14 PROCEDURE — 99606 MTMS BY PHARM EST 15 MIN: CPT

## 2025-03-14 RX ORDER — LOSARTAN POTASSIUM AND HYDROCHLOROTHIAZIDE 25; 100 MG/1; MG/1
1 TABLET ORAL DAILY
Qty: 90 TABLET | Refills: 2 | Status: SHIPPED | OUTPATIENT
Start: 2025-03-14

## 2025-03-14 NOTE — PROGRESS NOTES
Medication Therapy Management (MTM) Encounter    ASSESSMENT:                            Medication Adherence/Access:  Patient seem to not remember some of his medications.      Diabetes Type II/weight loss: A1c slightly above goal of < 7%. Most of the CGM readings within range (70 - 180) 96% well above goal of > 70%. Considering patient is experiencing incontinence, ED,  and urine flow, and emptying issues reasonable to hold mounjaro, and assess if it is the cause of the conditions.  Unable to find credible studies that show the connection. Due for another A1C, and BMP. Will continue to follow up.     Hypertension: In clinic and home BP reasonably within goal of < 130/80. Will continue current medications as prescribed.     Mental Health   Anxiety, Depression, and Insomnia: The recently increased fluoxetine helping patient with improved mood;however, patient is complaining of ED and incontinence. This could be likely due to the side effect of SSRIs. Reasonable for patient to talk to his Psych. Will follow up closely.     ED/BPH/Incontinence:Unknown if this conditions are caused by GLP-1/GIP agonists or likely due to increased dose of SSRIs. These medications are known to cause ED ranging from 25% to 75%. Patient will communicate with Psych, and reasonable to lower the dose. Reasonable to refer patient to urology. We will consider holding mounjaro to assess if it is causing ED and incontinence. If holding medication did not make a difference reasonable to start medications such as finasteride (5- alpha reductase inhibitors).  Will follow up closely.     PLAN:                            Hold mounjaro dose for a month and see the difference on ED/Incontinence and also BG  Please bring all your medications the next time we see each other to assess what you are taking, and not taking   Please talk to Psych for the side effects of Fluoxetine, which is ED. You are taking a higher dose, but your mood has improve with the  "dose increase   Patient is due for A1C and BMP, and wanted to check during his next visit with Dr. Frey. Patient wanted to check thyroid, Lipid, and Uric acid levels.    PharmD to communicate with Dr. Frey to refer patient to urology, and patient wanted treatment for ED.     Follow-up: Due on 04/18/2025 @ 11 AM    SUBJECTIVE/OBJECTIVE:                          Jerson Lynn is a 59 year old male seen for a follow up visit. [employer] was used (Name: Kimi ID: [id number])     Reason for visit: Medication Review and Diabetes Type II.     Allergies/ADRs: Reviewed in chart  Past Medical History: Reviewed in chart  Tobacco: He reports that he has never smoked. He has never been exposed to tobacco smoke. He has never used smokeless tobacco.  Alcohol: none  Medication Adherence/Access: Patient seem to not remember some of his medications.     Diabetes Type II:               Jardiance 25 mg daily               Zepbound 2.5 mg weekly                Metformin 1000 mg BID              Aspirin 81mg daily  Patient noted he have some fogginess;he is having some delayed thinking.  Current diabetes symptoms: Recently, noted he is getting tired at night and his sensor alarm  is going off at night.    Blood sugar monitoring: Check CGM below  Diet/Exercise: Patient noted   Eye exam is up to date  Foot exam: Due  Estimated body mass index is 37.15 kg/m  as calculated from the following:    Height as of 10/30/24: 5' 3\" (1.6 m).    Weight as of 10/30/24: 209 lb 11.2 oz (95.1 kg).     Lab Results   Component Value Date    A1C 7.2 10/30/2024    A1C 8.4 08/07/2024                 Lab Results   Component Value Date    A1C 7.2 10/30/2024    A1C 8.4 08/07/2024     Hypertension:              Hyzaar 100 - 25 mg daily   Patient reports no current medication side effects  Patient noted he checks his BP and it is higher when he is stressed.      BP Readings from Last 3 Encounters:   03/14/25 122/80   01/31/25 100/60   01/17/25 (!) 130/90     Mental " Health  Anxiety, Depression, and Insomnia:               Fluoxetine 60 mg daily   Patient reports no current medication side effects.   Previously, patient was active, but went through divorce and that has affected his physical and mental health. This has been going on for the last 10 years. Recently Psych increase fluoxetine dose from 40 to 60 mg and noted his mood improved and he is sleeping better.    - Patient noted he is not sleeping well at night due to getting shortness of breath. Patient is going see a sleep doctor and he will be ordered a CPAP machine to help him breath overnight.    - Patient is complained of his penis not been erecting even for urinating. This might be the side effect of the high dose Fluoxetine. Psych thinks this could be likely due to mounjaro. Patient noted this condition started when he started taking the mounjaro and also increased dose of fluoxetine.     ED/BPH/Incontinence:   Prazosin 2 mg at bedtime   - Patient is complained of his penis not been erecting even for urinating. This might be the side effect of the high dose Fluoxetine. Psych thinks this could be likely due to mounjaro. Patient noted this condition started when he started taking the mounjaro and also increased dose of fluoxetine.   - Patient agreeable to urology referral.        Today's Vitals: /80   Wt 181 lb 9.6 oz (82.4 kg)   BMI 32.17 kg/m    ----------------      I spent 60 minutes with this patient today. All changes were made via collaborative practice agreement with Clemente Frey MD.     A summary of these recommendations was given to the patient.     Medication Therapy Recommendations  No medication therapy recommendations to display     Stefanie Funk, PharmD     Medication Therapy Management (MTM) Pharmacist     [phone]     [email or web address]

## 2025-03-14 NOTE — Clinical Note
Yael Frey,  Patient noted he is having ED and incontinence, and would benefit from urology referral.  Thanks, Stefanie

## 2025-03-17 ENCOUNTER — OFFICE VISIT (OUTPATIENT)
Dept: SLEEP MEDICINE | Facility: CLINIC | Age: 60
End: 2025-03-17
Payer: COMMERCIAL

## 2025-03-17 VITALS
SYSTOLIC BLOOD PRESSURE: 143 MMHG | HEIGHT: 63 IN | WEIGHT: 182.4 LBS | BODY MASS INDEX: 32.32 KG/M2 | DIASTOLIC BLOOD PRESSURE: 96 MMHG | OXYGEN SATURATION: 99 % | HEART RATE: 69 BPM

## 2025-03-17 DIAGNOSIS — G47.33 OSA (OBSTRUCTIVE SLEEP APNEA): Primary | ICD-10-CM

## 2025-03-17 DIAGNOSIS — G47.36 HYPOXEMIA ASSOCIATED WITH SLEEP: ICD-10-CM

## 2025-03-17 PROCEDURE — 99213 OFFICE O/P EST LOW 20 MIN: CPT | Performed by: NURSE PRACTITIONER

## 2025-03-17 PROCEDURE — 1126F AMNT PAIN NOTED NONE PRSNT: CPT | Performed by: NURSE PRACTITIONER

## 2025-03-17 PROCEDURE — 3080F DIAST BP >= 90 MM HG: CPT | Performed by: NURSE PRACTITIONER

## 2025-03-17 PROCEDURE — 3077F SYST BP >= 140 MM HG: CPT | Performed by: NURSE PRACTITIONER

## 2025-03-17 ASSESSMENT — SLEEP AND FATIGUE QUESTIONNAIRES
HOW LIKELY ARE YOU TO NOD OFF OR FALL ASLEEP WHILE WATCHING TV: SLIGHT CHANCE OF DOZING
HOW LIKELY ARE YOU TO NOD OFF OR FALL ASLEEP WHILE SITTING INACTIVE IN A PUBLIC PLACE: SLIGHT CHANCE OF DOZING
HOW LIKELY ARE YOU TO NOD OFF OR FALL ASLEEP WHILE SITTING AND READING: SLIGHT CHANCE OF DOZING
HOW LIKELY ARE YOU TO NOD OFF OR FALL ASLEEP IN A CAR, WHILE STOPPED FOR A FEW MINUTES IN TRAFFIC: WOULD NEVER DOZE
HOW LIKELY ARE YOU TO NOD OFF OR FALL ASLEEP WHEN YOU ARE A PASSENGER IN A CAR FOR AN HOUR WITHOUT A BREAK: WOULD NEVER DOZE
HOW LIKELY ARE YOU TO NOD OFF OR FALL ASLEEP WHILE LYING DOWN TO REST IN THE AFTERNOON WHEN CIRCUMSTANCES PERMIT: SLIGHT CHANCE OF DOZING
HOW LIKELY ARE YOU TO NOD OFF OR FALL ASLEEP WHILE SITTING QUIETLY AFTER LUNCH WITHOUT ALCOHOL: WOULD NEVER DOZE
HOW LIKELY ARE YOU TO NOD OFF OR FALL ASLEEP WHILE SITTING AND TALKING TO SOMEONE: WOULD NEVER DOZE

## 2025-03-17 NOTE — PROGRESS NOTES
Sleep Study Follow-Up Visit:  *Due to language barrier, a ong  was present via iPad during the history-taking and subsequent discussion (and for part of the physical exam) with this patient.     Date on this visit: 3/17/2025    Jerson Lynn is a 59-year-old male with a PMH significant for HTN, HLD, DM 2, hypothyroidism, major depression, and obesity who comes in today for follow-up of his sleep study done on 1/27/2025 at the [facility] for possible sleep apnea and hypoventilation/hypoxemia .            These findings were reviewed with patient.     Past medical/surgical history, family history, social history, medications and allergies were reviewed.      Problem List:  Patient Active Problem List    Diagnosis Date Noted    JAZ (obstructive sleep apnea) 01/29/2025     Priority: Medium     1/27/2025 [facility] Sleep Study (215.0 lbs) - AHI 73.6, RDI 80.5, Supine .0, REM AHI 68.6, Low O2% 77.0%, Time Spent <=88% 17.5, Time Spent <=89% 22.0. Treatment was titrated to a pressure of CPAP 16 with an AHI 29.3. Time spent in REM supine at this pressure was - minutes.      Chronic pain of both knees 08/13/2024     Priority: Medium    Type 2 diabetes mellitus without complication, without long-term current use of insulin (H) 08/07/2024     Priority: Medium    Class 2 severe obesity due to excess calories with serious comorbidity in adult (H) 08/07/2024     Priority: Medium    Mixed hyperlipidemia 08/07/2024     Priority: Medium    Primary hypertension 08/07/2024     Priority: Medium    Acquired hypothyroidism 08/07/2024     Priority: Medium    Moderate episode of recurrent major depressive disorder (H) 08/07/2024     Priority: Medium      Current Outpatient Medications   Medication Sig Dispense Refill    aspirin (ASPIRIN LOW DOSE) 81 MG EC tablet TAKE 1 PILL BY MOUTH EVERY DAY / TXFABIOA HNUB NOJ 1 LUB TSHUAJ PAB KOM NTSHAV KHIAV ZOO 90 tablet 3    atorvastatin (LIPITOR) 40 MG  tablet TAKE 1 PILL BY MOUTH EVERY NIGHT AT BEDTIME FOR HIGH CHOLESTEROL / TXHUA HMO NOJ 1 LUB TSHUAJ THAUM MUS PW PAB NTSHAV MUAJ ROJ      Blood Pressure Monitoring (COMFORT TOUCH BP CUFF/MEDIUM) MISC 1 each daily. 1 each 0    colchicine (COLCRYS) 0.6 MG tablet Take 1 tablet (0.6 mg) by mouth daily as needed for gout pain. 30 tablet 0    Continuous Glucose Sensor (FREESTYLE ALEC 3 PLUS SENSOR) MISC Change every 15 days. 6 each 11    Continuous Glucose Sensor (FREESTYLE ALEC 3 SENSOR) MISC Change every 14 days. 6 each 5    cyclobenzaprine (FLEXERIL) 10 MG tablet Take 1 tablet twice a day by oral route as needed for 15 days, for muscle cramping.      diclofenac (VOLTAREN) 1 % topical gel Apply 2 g topically 4 times daily as needed for moderate pain. Apply thin layer to knuckles and knees 150 g 5    empagliflozin (JARDIANCE) 25 MG TABS tablet Take 1 tablet (25 mg) by mouth daily. 90 tablet 3    FLUoxetine (PROZAC) 20 MG capsule Take 1 capsule (20mg) along with other fluoxetine prescription (40mg) totaling 60mg in the morning. Noj 1 ntsiav tshuaj (20mg) nrog krissy lwm cov tshuaj fluoxetine (40mg) tag nrho 60mg thaum sawv ntxov. 30 capsule 1    FLUoxetine (PROZAC) 40 MG capsule Take 1 capsule (40 mg) by mouth daily. Noj 1 capsule (40 mg) ntawm qhov ncauj txhua hnub. 30 capsule 1    gabapentin (NEURONTIN) 300 MG capsule Take 1 capsule twice a day by oral route as needed for 30 days, for nerve pain.      levothyroxine (SYNTHROID/LEVOTHROID) 150 MCG tablet TAKE 1 PILL BY MOUTH EVERY DAY FOR HYPOTHYROIDISM/ NOJ IB LUB IB HNUB PAB KRISSY LUB THYROID      losartan-hydrochlorothiazide (HYZAAR) 100-25 MG tablet Take 1 tablet by mouth daily. 90 tablet 2    lurasidone (LATUDA) 20 MG TABS tablet Take 1 tablet (20 mg) by mouth daily with food. Noj ib ntsiav tshuaj txhua hnub nrog zaub mov uas muaj tsawg kawg yog 300 calorie ntau ntau. 30 tablet 1    metFORMIN (GLUCOPHAGE) 1000 MG tablet TAKE 1 PILL BY MOUTH TWO TIMES A DAY WITH MORNING  "AND EVENING MEAL/ TXHUA HNUB NOJ 1 LUB 2 ZAUG NROG TSHIAS THIAB NROG HMO PAB ZOO NTSHAV QAB ZIB      naproxen (NAPROSYN) 500 MG tablet TAKE 1 TABLET BY MOUTH TWICE A DAY AS NEEDED FOR BILATERAL LEG PAIN FOR 15 DAYS      prazosin (MINIPRESS) 2 MG capsule Take 1 capsule (2 mg) by mouth at bedtime. Noj ib ntsiav tshuaj (2mg) thaum pw. 30 capsule 1    tirzepatide (MOUNJARO) 2.5 MG/0.5ML SOAJ auto-injector pen Inject 0.5 mLs (2.5 mg) subcutaneously every 7 days. On hold for a month 2 mL 0    Transparent Dressings (TEGADERM I.V. 2\"X2-1/4\") MISC 1 each every 14 days. 30 each 4     No current facility-administered medications for this visit.     BP (!) 143/96   Pulse 69   Ht 1.6 m (5' 2.99\")   Wt 82.7 kg (182 lb 6.4 oz)   SpO2 99%   BMI 32.32 kg/m      Impression/Plan:  Severe Obstructive Sleep Apnea.   Sleep associated hypoxemia was present.  - Comprehensive DME    Treatment options discussed today including Auto?titrating PAP therapy with a range of 10 cmH2O to 17 cmH2O with lateral sleep positioning, or repeat PSG with all night bi-level titration.      Elected treatment with auto-CPAP at 10-17 cmH2O. A comprehensive DME order was placed for new APAP device, mask of choice, and supplies sent to [facility], today, per patient request.  We also discussed usual use/compliance requirements associated with new PAP device therapy, if applicable.    He will follow up with me in about 2-3 month(s) after obtaining new APAP device to review download data and use/compliance.     28 minutes spent with patient, all of which were spent face-to-face counseling, consulting, chart review/documentation, and coordinating plan of care on the date of the encounter.      TAMMIE Ramos CNP  Sleep Medicine      CC: Clemente Frey     This note was written with the assistance of the Dragon voice-dictation technology software. The final document, although reviewed, may contain errors. For corrections, please " contact the office.

## 2025-03-17 NOTE — PATIENT INSTRUCTIONS
"MY INFORMATION ON SLEEP APNEA-  Jerson Lynn    DOCTOR : TAMMIE Ramos CNP  SLEEP CENTER :      MY CONTACT NUMBER:   [facility]  [phone]  [facility]   [phone]  [facility]   [phone]    [facility]  [phone]    DME:  [facility] [place] [address], [address]  [place] [address]  Phone: [phone]    Hours:  Mon - Fri: 8:00 a.m. - 4:30 p.m.  Sat: Closed  Sun: Closed      Key Points:  1. What is Obstructive Sleep Apnea (JAZ)? JAZ is the most common type of sleep apnea. Apnea literally means, \"without breath.\" It is characterized by repetitive pauses in breathing, despite continued effort to breathe, and is usually associated with a reduction in blood oxygen saturation. Apneas can last 10 to over 60 seconds. It is caused by narrowing or collapse of the upper airway as muscles relax during sleep.   2. What are the consequences of JAZ? Symptoms include: daytime sleepiness- possibly increasing the risk of falling asleep while driving, unrefreshing/restless sleep, snoring, insomnia, waking frequently to urinate, waking with heartburn or reflux, reduced concentration and memory, and morning headaches. Other health consequences may include development of high blood pressure. Untreated JAZ also can contribute to heart disease, stroke and diabetes.   3. What are the treatment options? In most situations, sleep apnea is a lifelong disease that must be managed with daily therapy. Continuous Positive Airway (CPAP) is the most reliable treatment. A mouthguard to hold your jaw forward is usually the next most reliable option. Other options include postioning devices (to keep you off your back), nasal valves, tongue retaining device, weight loss, surgery. There is more detail about these options toward the end of this document.  4. What are the most important things to remember " about using CPAP?     WHERE CAN I FIND MORE INFORMATION?    American Academy of Sleep Medicine Patient information on sleep disorders:  http://yoursleep.aasmnet.org    CPAP -  WHY AND HOW?                 Continuous positive airway pressure, or CPAP, is the most effective treatment for obstructive sleep apnea. It works by blowing room air, through a mask, to hold your throat open. A decision to use CPAP is a major step forward in the pursuit of a healthier life. The successful use of CPAP will help you breathe easier, sleep better and live healthier. Using CPAP can be a positive experience if you keep these santoyo points in mind:  Commitment  CPAP is not a quick fix for your problem. It involves a long-term commitment to improve your sleep and your health.    Communication  Stay in close communication with both your sleep doctor and your CPAP supplier. Ask lots of questions and seek help when you need it.    Consistency  Use CPAP all night, every night and for every nap. You will receive the maximum health benefits from CPAP when you use it every time that you sleep. This will also make it easier for your body to adjust to the treatment.    Correction  The first machine and mask that you try may not be the best ones for you. Work with your sleep doctor and your CPAP supplier to make corrections to your equipment selection. Ask about trying a different type of machine or mask if you have ongoing problems. Make sure that your mask is a good fit and learn to use your equipment properly.    Challenge  Tell a family member or close friend to ask you each morning if you used your CPAP the previous night. Have someone to challenge you to give it your best effort.    Connection   Your adjustment to CPAP will be easier if you are able to connect with others who use the same treatment. Ask your sleep doctor if there is a support group in your area for people who have sleep apnea, or look for one on the Internet.  Comfort  "  Increase your level of comfort by using a saline spray, decongestant or heated humidifier if CPAP irritates your nose, mouth or throat. Use your unit's \"ramp\" setting to slowly get used to the air pressure level. There may be soft pads you can buy that will fit over your mask straps. Look on www.CPAP.com for accessories that can help make CPAP use more comfortable.  Cleaning   Clean your mask, tubing and headgear on a regular basis. Put this time in your schedule so that you don't forget to do it. Check and replace the filters for your CPAP unit and humidifier.    Completion   Although you are never finished with CPAP therapy, you should reward yourself by celebrating the completion of your first month of treatment. Expect this first month to be your hardest period of adjustment. It will involve some trial and error as you find the machine, mask and pressure settings that are right for you.    Continuation  After your first month of treatment, continue to make a daily commitment to use your CPAP all night, every night and for every nap.    CPAP-Tips to starting with success:  Begin using your CPAP for short periods of time during the day while you watch TV or read.    Use CPAP every night and for every nap. Using it less often reduces the health benefits and makes it harder for your body to get used to it.    Newer CPAP models are virtually silent; however, if you find the sound of your CPAP machine to be bothersome, place the unit under your bed to dampen the sound.     Make small adjustments to your mask, tubing, straps and headgear until you get the right fit. Tightening the mask may actually worsen the leak.  If it leaves significant marks on your face or irritates the bridge of your nose, it may not be the best mask for you.  Speak with the person who supplied the mask and consider trying other masks. Insurances will allow you to try different masks during the first month of starting CPAP.  Insurance also " covers a new mask, hose and filter about every 6 months.    Use a saline nasal spray to ease mild nasal congestion. Neti-Pot or saline nasal rinses may also help. Nasal gel sprays can help reduce nasal dryness.  Biotene mouthwash can be helpful to protect your teeth if you experience frequent dry mouth.  Dry mouth may be a sign of air escaping out of your mouth or out of the mask in the case of a full face mask.  Speak with your provider if you expect that is the case.     Take a nasal decongestant to relieve more severe nasal or sinus congestion.  Do not use Afrin (oxymetazoline) nasal spray more than 3 days in a row.  Speak with your sleep doctor if your nasal congestion is chronic.    Use a heated humidifier that fits your CPAP model to enhance your breathing comfort. Adjust the heat setting up if you get a dry nose or throat, down if you get condensation in the hose or mask.  Position the CPAP lower than you so that any condensation in the hose drains back into the machine rather than towards the mask.    Try a system that uses nasal pillows if traditional masks give you problems.    Clean your mask, tubing and headgear once a week. Make sure the equipment dries fully.    Regularly check and replace the filters for your CPAP unit and humidifier.    Work closely with your sleep provider and your CPAP supplier to make sure that you have the machine, mask and air pressure setting that works best for you. It is better to stop using it and call your provider to solve problems than to lay awake all night frustrated with the device.  Weight Loss:    Weight loss decreases severity of sleep apnea in most people with obesity. For those with mild obesity who have developed snoring with weight gain, even 15-30 pound weight loss can improve and occasionally eliminate sleep apnea.  Structured and life-long dietary and health habits are necessary to lose weight and keep healthier weight levels.     Though there are significant  health benefits from weight loss, long-term weight loss is very difficult to achieve- studies show success with dietary management in less than 10% of people. In addition, substantial weight loss may require years of dietary control and may be difficult if patients have severe obesity. In these cases, surgical management may be considered.    If you are interested in methods for weight loss, you should review the options discussed at the National Institutes of Health patient information sites:     http://win.niddk.nih.gov/publications/index.htm  http:/www.health.nih.gov/topic/WeightLossDieting    Bariatric programs offer counseling in all methods of weight loss:    Http:/www.[facility].org/Specialties/WeightLossSurgeryandMedicalMgmt/htm    Your BMI is Body mass index is 32.32 kg/m .    Weight management plan: Patient was referred to their PCP to discuss a diet and exercise plan.    Body mass index (BMI) is one way to tell whether you are at a healthy weight, overweight, or obese. It measures your weight in relation to your height.  A BMI of 18.5 to 24.9 is in the healthy range. A person with a BMI of 25 to 29.9 is considered overweight, and someone with a BMI of 30 or greater is considered obese.  Another way to find out if you are at risk for health problems caused by overweight and obesity is to measure your waist. If you are a woman and your waist is more than 35 inches, or if you are a man and your waist is more than 40 inches, your risk of disease may be higher.  More than two-thirds of American adults are considered overweight or obese. Being overweight or obese increases the risk for further weight gain.  Excess weight may lead to heart disease and diabetes. Creating and following plans for healthy eating and physical activity may help you improve your health.    Methods for maintaining or losing weight.    Weight control is part of healthy lifestyle and includes exercise, emotional health, and healthy  eating habits.  Careful eating habits lifelong is the mainstay of weight control.  Though there are significant health benefits from weight loss, long-term weight loss with diet alone may be very difficult to achieve- studies show long-term success with dietary management in less than 10% of people. Attaining a healthy weight may be especially difficult to achieve in those with severe obesity. In some cases, medications, devices and surgical management might be considered.    What can you do?    If you are overweight or obese and are interested in methods for weight loss, you should discuss this with your provider. In addition, we recommend that you review healthy life styles and methods for weight loss available through the National Institutes of Health patient information sites:     http://win.niddk.nih.gov/publications/index.htm

## 2025-03-19 ENCOUNTER — THERAPY VISIT (OUTPATIENT)
Dept: OCCUPATIONAL THERAPY | Facility: REHABILITATION | Age: 60
End: 2025-03-19
Attending: STUDENT IN AN ORGANIZED HEALTH CARE EDUCATION/TRAINING PROGRAM
Payer: COMMERCIAL

## 2025-03-19 DIAGNOSIS — M19.042 ARTHRITIS OF BOTH HANDS: ICD-10-CM

## 2025-03-19 DIAGNOSIS — M19.041 ARTHRITIS OF BOTH HANDS: ICD-10-CM

## 2025-03-19 PROCEDURE — 97535 SELF CARE MNGMENT TRAINING: CPT | Mod: GO

## 2025-03-19 PROCEDURE — 97165 OT EVAL LOW COMPLEX 30 MIN: CPT | Mod: GO

## 2025-03-19 PROCEDURE — 97110 THERAPEUTIC EXERCISES: CPT | Mod: GO

## 2025-03-19 NOTE — PROGRESS NOTES
OCCUPATIONAL THERAPY EVALUATION  Type of Visit: Evaluation              Subjective        Presenting condition or subjective complaint:   hand arthritis  Date of onset: 03/19/24 (approximate) ~ a year    Relevant medical history:   No past medical history on file. DMII, hypothyroidism, HTN    Dates & types of surgery:  No past surgical history on file.      Prior diagnostic imaging/testing results:     x-ray  Prior therapy history for the same diagnosis, illness or injury: No      Prior Level of Function  Indep in all areas     Living Environment  Social support: Alone   Type of home: House; 1 level   Stairs to enter the home: Yes 6 Is there a railing: Yes     Ramp: No   Stairs inside the home: Yes 6 Is there a railing: No     Help at home: None  Equipment owned:       Employment: No    Hobbies/Interests:   none requiring repetitive use of hands    Patient goals for therapy:  decrease pain and stiffness    Pain assessment: Pain present  Gripping and trying to make a fist   Symptoms a little over a year      Objective   ADDITIONAL HISTORY:  Right hand dominant  Patient reports symptoms of pain, stiffness/loss of motion, weakness/loss of strength, and edema      PAIN:  Pain Level at Rest: 2/10  Pain Level with Use: 5/10  Pain Location: digits  Pain Quality: Aching, Sharp, and Tender  Pain Frequency: constant  Pain is Worst: daytime or nighttime  Pain is Exacerbated By: gripping, making a fist  Pain is Relieved By: none  Pain Progression: Unchanged      POSTURE: Rounded Forward Shoulders     ROM:   Hand ROM  Left AROM Right AROM    Index MP 75 70   PIP 65 65   DIP 60 32   Total Active Motion     Long MP 70 80   PIP 75 75   DIP -15/60 -10/60   Total Active Motion     RING MP 75 84   PIP 80 70   DIP 65 /50   Total Active Motion     Small MP 90 80   PIP 70 75   DIP -20/80 -15/65   Total Active Motion         OBSERVATIONS/APPEARANCE:   Observation Left Right   SF DIP mild radial deviation + +   Heberden's nodes SF, LF SF,  LF             EDC subluxation at MP joints - -                  Intrinsic tightness - -       SPECIAL TESTS:   CTS Special Tests  Pain Report Left Right   Median Nerve Compression at Pronator     Carpal Compression Test-Durkan Test (30 sec)     Tinel's at guyon's canal      Tinel's at Carpal Tunnel + numb just at CT + numb just at CT   Phalen's Sign +tight sensation, mild paresthesia + tight sensation, mild paresthesia       STRENGTH:     Measured in pounds 3/19/2025 3/19/2025    Left Right   Trial 1 25+ 21+   Trial 2     Trial 3     Average       Lateral Pinch  Measured in pounds 3/19/2025 3/19/2025    Left Right   Trial 1 7++ 9+   Trial 2     Trial 3     Average       3 Point Pinch  Measured in pounds 3/19/2025 3/19/2025    Left Right   Trial 1 3++ 4++   Trial 2     Trial 3     Average         PALPATION:  Tender at all DIP and PIP joints       Assessment & Plan   CLINICAL IMPRESSIONS  Medical Diagnosis: Vikash hand arthritis    Treatment Diagnosis: Vikash hand arthritis, bi hand stiffness and pain    Impression/Assessment: Pt is a 59 year old male presenting to Occupational Therapy due to vikash hand arthritis.  The following significant findings have been identified: Impaired activity tolerance, Impaired ROM, Impaired sensation, Impaired strength, and Pain.  These identified deficits interfere with their ability to perform recreational activities, household chores, and meal planning and preparation as compared to previous level of function.   Patient's limitations or Problem List includes: Pain, Decreased ROM/motion, Sensory disturbance, Decreased , and Decreased pinch of the bilateral hand, index finger, long finger, ring finger, and small finger which interferes with the patient's ability to perform Sleep Patterns, Recreational Activities, and Household Chores as compared to previous level of function.    Clinical Decision Making (Complexity):  Assessment of Occupational Performance: 3-5 Performance  Deficits  Occupational Performance Limitations: home establishment and management, meal preparation and cleanup, sleep, and leisure activities  Clinical Decision Making (Complexity): Low complexity    PLAN OF CARE  Treatment Interventions:  Modalities:  Paraffin  Therapeutic Exercise:  AROM, AAROM, Tendon Gliding, Blocking, Isometrics, and Stabilization  Neuromuscular re-education:  Coordination/Dexterity, Proprioceptive Training, Posture, and Kinesiotaping  Manual Techniques:  Joint mobilization, Myofascial release, and Manual edema mobilization  Orthotic Fabrication:  Static  Self Care:  Self Care Tasks and Ergonomic Considerations    Long Term Goals   OT Goal 1  Goal Description: Pt will ID 2 effective pain mgmt/joint protection strategies for implementation into daily occupations, for improved QOL and hand function  Rationale: In order to maximize safety and independence with ADL/IADLs  Target Date: 04/30/25      Frequency of Treatment: 1x/week  Duration of Treatment: 6 weeks     Recommended Referrals to Other Professionals:  In PT for knees  Education Assessment: Learner/Method: Patient  Education Comments:  present, pt demos understanding of all material     Risks and benefits of evaluation/treatment have been explained.   Patient/Family/caregiver agrees with Plan of Care.     Evaluation Time:    OT Eval, Low Complexity Minutes (81808): 15       Signing Clinician: MARGUERITE Mathur [facility]                                                                                   OUTPATIENT OCCUPATIONAL THERAPY      PLAN OF TREATMENT FOR OUTPATIENT REHABILITATION   Patient's Last Name, First Name, Jerson Quintanilla    YOB: 1965   Provider's Name   [facility]   Medical Record No.  [id number]     Onset Date: 03/19/24 (approximate) Start of Care Date: 03/19/25     Medical Diagnosis:  Vikash hand arthritis      OT Treatment  Diagnosis:  Vikash hand arthritis, bi hand stiffness and pain Plan of Treatment  Frequency/Duration:1x/week/6 weeks    Certification date from 03/19/25   To 06/11/25        See note for plan of treatment details and functional goals     MARGUERITE Mathur                         I CERTIFY THE NEED FOR THESE SERVICES FURNISHED UNDER        THIS PLAN OF TREATMENT AND WHILE UNDER MY CARE     (Physician attestation of this document indicates review and certification of the therapy plan).              Referring Provider:  Corrie Castellon    Initial Assessment  See Epic Evaluation- 03/19/25

## 2025-03-24 ENCOUNTER — PATIENT OUTREACH (OUTPATIENT)
Dept: CARE COORDINATION | Facility: CLINIC | Age: 60
End: 2025-03-24
Payer: COMMERCIAL

## 2025-03-24 ENCOUNTER — DOCUMENTATION ONLY (OUTPATIENT)
Dept: SLEEP MEDICINE | Facility: CLINIC | Age: 60
End: 2025-03-24
Payer: COMMERCIAL

## 2025-03-24 DIAGNOSIS — G47.33 OBSTRUCTIVE SLEEP APNEA (ADULT) (PEDIATRIC): Primary | ICD-10-CM

## 2025-03-24 NOTE — PROGRESS NOTES
Care Coordination Clinician Chart Review    Situation: Patient chart reviewed by Care Coordinator.       Background: Care Coordination Program started: 10/11/2024. Initial assessment completed and patient-centered care plan(s) were developed with participation from patient. Lead CC handed patient off to CHW for continued outreaches.       Assessment: Per chart review, patient outreach completed by CC CHW on 3-5-2025.  Patient is actively working to accomplish goal(s). Patient's goal(s) appropriate and relevant at this time. Patient is not due for updated Plan of Care.  Assessments will be completed annually or as needed/with change of patient status.      Care Plan: Financial Wellbeing       Problem: Patient expresses financial resource strain       Long-Range Goal: Apply for Social Security Disability.       Start Date: 10/18/2024 Expected End Date: 10/17/2025    This Visit's Progress: 40% Recent Progress: 30%    Priority: High    Note:     Barriers: long process, stressed, anxiety is high, language barrier.  Strengths: motivated, has some family support.  Patient expressed understanding of goal: yes  Action steps to achieve this goal:  1. I will work with Disability Specialists to see if I can apply for Disability through Social Security.   2. I will complete application process.   3. I will report progress towards this goal at scheduled outreach telephone calls from the CCC team.    3/5- discussed, patient has evaluation scheduled 3/10 and follow up 3/25  2/5 discussed, pt received call from [facility] to schedule an evaluation with their SW/Medical team- no appt scheduled at this time  1/6 discussed, paperwork has been submitted, no update at this time  11/22- discussed with patient, states he has started the papework                               Care Plan: Mental Health       Problem: Mental Health Symptoms Need Improvement       Long-Range Goal: Improve management of mental health symptoms and establish  with mental health/psychosocial supports       Start Date: 10/18/2024 Expected End Date: 10/17/2025    Recent Progress: 40%    Priority: High    Note:     Barriers: very stressed, anxiety.  Strengths: motivated, engaging with care team, support from daughter.  Patient expressed understanding of goal: yes  Action steps to achieve this goal:  1. I will work with [facility] to get support from therapy, and medication management.  2. I will work with [facility] program and will learn new skills.  3. I will report progress towards this goal at scheduled outreach telephone calls from the [facility] team.    3/5- discussed has follow up call later in month  2/5 discussed, next appt scheduled in February 1/6/25- discussed, next appt on 1/10/25  11/22- discussed with patient, has appt scheduled on 12/19 at 1:00 pm                              Care Plan: General       Problem: HP GENERAL PROBLEM       Goal: I will work with my PCP to see if I can get a handicapped parking card.       Start Date: 2/10/2025 Expected End Date: 7/1/2025    This Visit's Progress: 10%    Priority: High    Note:     Barriers: none noted.   Strengths: motivated.  Patient expressed understanding of goal: yes  Action steps to achieve this goal:  1. I will work with my PCP to answer any questions.   2. I will look for mail from [facility].  3. I will report progress towards this goal at scheduled outreach telephone calls from the CCC team.    3/5- per chart review was sent to Dr. Castellon on 2/19- per patient he still does not have placard- CHW will send message to Dr. Castellon today to see if there is any update                                 Plan/Recommendations: The patient will continue working with Care Coordination to achieve goal(s) as above. CHW will continue outreaches at minimum every 30 days and will involve Lead CC as needed or if patient is ready to move to Maintenance. Lead CC will continue to monitor CHW outreaches and patient's progress to goal(s)  every 6 weeks.     Plan of Care updated and sent to patient: No

## 2025-03-24 NOTE — PROGRESS NOTES
[facility] was offered [facility]  Patient Jerson Lynn was set up at [place] on March 24, 2025. Patient received a [employer] Airsense 10 Pressures were set at  10-17 cm H2O.   Patient s ramp is 5 cm H2O for Auto and FLEX/EPR is EPR, 2.  Patient received a [employer] Mask name: Vitera Full Face mask size Medium, heated tubing and heated humidifier.  Patient has the following compliance requirements: usage only.  Patient has a follow up on 7/3/25 with TAMMIE Ramos, CNP.    JANNIE STEVENS

## 2025-03-25 DIAGNOSIS — F43.10 PTSD (POST-TRAUMATIC STRESS DISORDER): ICD-10-CM

## 2025-03-25 DIAGNOSIS — F33.1 MODERATE EPISODE OF RECURRENT MAJOR DEPRESSIVE DISORDER (H): ICD-10-CM

## 2025-03-25 RX ORDER — FLUOXETINE HYDROCHLORIDE 40 MG/1
40 CAPSULE ORAL DAILY
Qty: 90 CAPSULE | Refills: 1 | Status: SHIPPED | OUTPATIENT
Start: 2025-03-25

## 2025-03-26 ENCOUNTER — THERAPY VISIT (OUTPATIENT)
Dept: OCCUPATIONAL THERAPY | Facility: REHABILITATION | Age: 60
End: 2025-03-26
Payer: COMMERCIAL

## 2025-03-26 ENCOUNTER — OFFICE VISIT (OUTPATIENT)
Dept: INTERNAL MEDICINE | Facility: CLINIC | Age: 60
End: 2025-03-26
Payer: COMMERCIAL

## 2025-03-26 ENCOUNTER — ORDERS ONLY (AUTO-RELEASED) (OUTPATIENT)
Dept: INTERNAL MEDICINE | Facility: CLINIC | Age: 60
End: 2025-03-26

## 2025-03-26 VITALS
OXYGEN SATURATION: 95 % | BODY MASS INDEX: 33.13 KG/M2 | WEIGHT: 180 LBS | TEMPERATURE: 97.7 F | DIASTOLIC BLOOD PRESSURE: 81 MMHG | HEIGHT: 62 IN | SYSTOLIC BLOOD PRESSURE: 131 MMHG | HEART RATE: 71 BPM | RESPIRATION RATE: 18 BRPM

## 2025-03-26 DIAGNOSIS — R53.83 OTHER FATIGUE: ICD-10-CM

## 2025-03-26 DIAGNOSIS — Z12.11 SCREEN FOR COLON CANCER: ICD-10-CM

## 2025-03-26 DIAGNOSIS — M19.041 ARTHRITIS OF BOTH HANDS: Primary | ICD-10-CM

## 2025-03-26 DIAGNOSIS — E78.2 MIXED HYPERLIPIDEMIA: ICD-10-CM

## 2025-03-26 DIAGNOSIS — M19.042 ARTHRITIS OF BOTH HANDS: Primary | ICD-10-CM

## 2025-03-26 DIAGNOSIS — E11.9 TYPE 2 DIABETES MELLITUS WITHOUT COMPLICATION, WITHOUT LONG-TERM CURRENT USE OF INSULIN (H): Primary | ICD-10-CM

## 2025-03-26 PROBLEM — E66.01 CLASS 2 SEVERE OBESITY DUE TO EXCESS CALORIES WITH SERIOUS COMORBIDITY IN ADULT (H): Status: RESOLVED | Noted: 2024-08-07 | Resolved: 2025-03-26

## 2025-03-26 PROBLEM — E66.812 CLASS 2 SEVERE OBESITY DUE TO EXCESS CALORIES WITH SERIOUS COMORBIDITY IN ADULT (H): Status: RESOLVED | Noted: 2024-08-07 | Resolved: 2025-03-26

## 2025-03-26 LAB
ALBUMIN SERPL BCG-MCNC: 4.4 G/DL (ref 3.5–5.2)
ALP SERPL-CCNC: 84 U/L (ref 40–150)
ALT SERPL W P-5'-P-CCNC: 54 U/L (ref 0–70)
ANION GAP SERPL CALCULATED.3IONS-SCNC: 13 MMOL/L (ref 7–15)
AST SERPL W P-5'-P-CCNC: 43 U/L (ref 0–45)
BILIRUB SERPL-MCNC: 1.6 MG/DL
BUN SERPL-MCNC: 14.2 MG/DL (ref 6–20)
CALCIUM SERPL-MCNC: 10.3 MG/DL (ref 8.8–10.4)
CHLORIDE SERPL-SCNC: 101 MMOL/L (ref 98–107)
CREAT SERPL-MCNC: 1.11 MG/DL (ref 0.67–1.17)
EGFRCR SERPLBLD CKD-EPI 2021: 77 ML/MIN/1.73M2
ERYTHROCYTE [DISTWIDTH] IN BLOOD BY AUTOMATED COUNT: 13.1 % (ref 10–15)
EST. AVERAGE GLUCOSE BLD GHB EST-MCNC: 140 MG/DL
GLUCOSE SERPL-MCNC: 108 MG/DL (ref 70–99)
HBA1C MFR BLD: 6.5 % (ref 0–5.6)
HCO3 SERPL-SCNC: 27 MMOL/L (ref 22–29)
HCT VFR BLD AUTO: 51.7 % (ref 40–53)
HGB BLD-MCNC: 17 G/DL (ref 13.3–17.7)
MCH RBC QN AUTO: 28.4 PG (ref 26.5–33)
MCHC RBC AUTO-ENTMCNC: 32.9 G/DL (ref 31.5–36.5)
MCV RBC AUTO: 86 FL (ref 78–100)
PLATELET # BLD AUTO: 236 10E3/UL (ref 150–450)
POTASSIUM SERPL-SCNC: 3.5 MMOL/L (ref 3.4–5.3)
PROT SERPL-MCNC: 7.6 G/DL (ref 6.4–8.3)
RBC # BLD AUTO: 5.99 10E6/UL (ref 4.4–5.9)
SODIUM SERPL-SCNC: 141 MMOL/L (ref 135–145)
TSH SERPL DL<=0.005 MIU/L-ACNC: 0.95 UIU/ML (ref 0.3–4.2)
WBC # BLD AUTO: 8.5 10E3/UL (ref 4–11)

## 2025-03-26 PROCEDURE — 85027 COMPLETE CBC AUTOMATED: CPT | Performed by: INTERNAL MEDICINE

## 2025-03-26 PROCEDURE — T1013 SIGN LANG/ORAL INTERPRETER: HCPCS | Mod: U4 | Performed by: INTERPRETER

## 2025-03-26 PROCEDURE — 1125F AMNT PAIN NOTED PAIN PRSNT: CPT | Performed by: INTERNAL MEDICINE

## 2025-03-26 PROCEDURE — 97140 MANUAL THERAPY 1/> REGIONS: CPT | Mod: GO

## 2025-03-26 PROCEDURE — 97112 NEUROMUSCULAR REEDUCATION: CPT | Mod: GO

## 2025-03-26 PROCEDURE — 97018 PARAFFIN BATH THERAPY: CPT | Mod: GO

## 2025-03-26 PROCEDURE — 84443 ASSAY THYROID STIM HORMONE: CPT | Performed by: INTERNAL MEDICINE

## 2025-03-26 PROCEDURE — 3075F SYST BP GE 130 - 139MM HG: CPT | Performed by: INTERNAL MEDICINE

## 2025-03-26 PROCEDURE — 99214 OFFICE O/P EST MOD 30 MIN: CPT | Performed by: INTERNAL MEDICINE

## 2025-03-26 PROCEDURE — 36415 COLL VENOUS BLD VENIPUNCTURE: CPT | Performed by: INTERNAL MEDICINE

## 2025-03-26 PROCEDURE — 80053 COMPREHEN METABOLIC PANEL: CPT | Performed by: INTERNAL MEDICINE

## 2025-03-26 PROCEDURE — 83036 HEMOGLOBIN GLYCOSYLATED A1C: CPT | Performed by: INTERNAL MEDICINE

## 2025-03-26 PROCEDURE — 3079F DIAST BP 80-89 MM HG: CPT | Performed by: INTERNAL MEDICINE

## 2025-03-26 PROCEDURE — G2211 COMPLEX E/M VISIT ADD ON: HCPCS | Performed by: INTERNAL MEDICINE

## 2025-03-26 ASSESSMENT — PATIENT HEALTH QUESTIONNAIRE - PHQ9
SUM OF ALL RESPONSES TO PHQ QUESTIONS 1-9: 23
10. IF YOU CHECKED OFF ANY PROBLEMS, HOW DIFFICULT HAVE THESE PROBLEMS MADE IT FOR YOU TO DO YOUR WORK, TAKE CARE OF THINGS AT HOME, OR GET ALONG WITH OTHER PEOPLE: EXTREMELY DIFFICULT
SUM OF ALL RESPONSES TO PHQ QUESTIONS 1-9: 23

## 2025-03-26 ASSESSMENT — ANXIETY QUESTIONNAIRES
7. FEELING AFRAID AS IF SOMETHING AWFUL MIGHT HAPPEN: NEARLY EVERY DAY
8. IF YOU CHECKED OFF ANY PROBLEMS, HOW DIFFICULT HAVE THESE MADE IT FOR YOU TO DO YOUR WORK, TAKE CARE OF THINGS AT HOME, OR GET ALONG WITH OTHER PEOPLE?: EXTREMELY DIFFICULT
7. FEELING AFRAID AS IF SOMETHING AWFUL MIGHT HAPPEN: NEARLY EVERY DAY
GAD7 TOTAL SCORE: 21
5. BEING SO RESTLESS THAT IT IS HARD TO SIT STILL: NEARLY EVERY DAY
1. FEELING NERVOUS, ANXIOUS, OR ON EDGE: NEARLY EVERY DAY
GAD7 TOTAL SCORE: 21
GAD7 TOTAL SCORE: 21
4. TROUBLE RELAXING: NEARLY EVERY DAY
IF YOU CHECKED OFF ANY PROBLEMS ON THIS QUESTIONNAIRE, HOW DIFFICULT HAVE THESE PROBLEMS MADE IT FOR YOU TO DO YOUR WORK, TAKE CARE OF THINGS AT HOME, OR GET ALONG WITH OTHER PEOPLE: EXTREMELY DIFFICULT
6. BECOMING EASILY ANNOYED OR IRRITABLE: NEARLY EVERY DAY
2. NOT BEING ABLE TO STOP OR CONTROL WORRYING: NEARLY EVERY DAY
3. WORRYING TOO MUCH ABOUT DIFFERENT THINGS: NEARLY EVERY DAY

## 2025-03-26 ASSESSMENT — PAIN SCALES - GENERAL: PAINLEVEL_OUTOF10: SEVERE PAIN (8)

## 2025-03-26 NOTE — PROGRESS NOTES
{PROVIDER CHARTING PREFERENCE:223736}    Fariha Arthur is a 59 year old, presenting for the following health issues:  office visit, Recheck Medication, and Diabetes (Pt reports that he's here to follow up on diabetes, knee pain, hypertension, thyroid check,pain in all fingers on both hands.)      3/26/2025     3:53 PM   Additional Questions   Roomed by estefany   Accompanied by alone         3/26/2025     3:53 PM   Patient Reported Additional Medications   Patient reports taking the following new medications none     History of Present Illness       Mental Health Follow-up:  Patient presents to follow-up on Depression & Anxiety.Patient's depression since last visit has been:  Better  The patient is having other symptoms associated with depression.  Patient's anxiety since last visit has been:  Better  The patient is having other symptoms associated with anxiety.  Any significant life events: relationship concerns, financial concerns, housing concerns and health concerns  Patient is feeling anxious or having panic attacks.  Patient has no concerns about alcohol or drug use.    Diabetes:   He presents for follow up of diabetes.  He is checking home blood glucose four or more times daily.   He checks blood glucose before and after meals and at bedtime.  Blood glucose is never over 200 and never under 70. He is aware of hypoglycemia symptoms including shakiness, weakness, lethargy and other.    He has no concerns regarding his diabetes at this time.  He is having excessive thirst and weight loss.            Hypertension: He presents for follow up of hypertension.  He does check blood pressure  regularly outside of the clinic. Outside blood pressures have been over 140/90. He follows a low salt diet.     He eats 2-3 servings of fruits and vegetables daily.He consumes 0 sweetened beverage(s) daily.He exercises with enough effort to increase his heart rate 9 or less minutes per day.  He exercises with enough effort to  "increase his heart rate 3 or less days per week.   He is taking medications regularly.        {MA/LPN/RN Pre-Provider Visit Orders- hCG/UA/Strep (Optional):601200}  {SUPERLIST (Optional):240855}  {additonal problems for provider to add (Optional):041363}    {ROS Picklists (Optional):471132}      Objective    /81 (BP Location: Left arm, Patient Position: Sitting, Cuff Size: Adult Large)   Pulse 71   Temp 97.7  F (36.5  C) (Tympanic)   Resp 18   Ht 1.575 m (5' 2\")   Wt 81.6 kg (180 lb)   SpO2 95%   BMI 32.92 kg/m    Body mass index is 32.92 kg/m .  Physical Exam   {Exam List (Optional):648989}    {Diagnostic Test Results (Optional):387058}        Signed Electronically by: Clemente Frey MD  {Email feedback regarding this note to [email or web address]   :245201}  "

## 2025-03-27 ENCOUNTER — DOCUMENTATION ONLY (OUTPATIENT)
Dept: SLEEP MEDICINE | Facility: CLINIC | Age: 60
End: 2025-03-27
Payer: COMMERCIAL

## 2025-03-27 NOTE — PROGRESS NOTES
3 day Sleep therapy management telephone visit    Diagnostic AHI:PS.6         Confirmed with patient at time of call- Yes Patient is still interested in STM service       Subjective measures:  Everything okay with CPAP right now. CPAP is really helping him sleep better he states. Spoke with patient through  services.          Objective data     Order Settings for PAP  CPAP min     CPAP max     CPAP fixed         Device settings from machine CPAP min 10.0     CPAP max 17.0      CPAP fixed      EPR Setting TWO    RESMED soft response  OFF     Assessment: Nightly usage over four hours      Patient has the following upcoming sleep appts:  Future Sleep Appointments         Provider Department    7/3/2025 1:00 PM (Arrive by 12:45 PM) Kranthi Parra, TAMMIE [facility]            Replacement device: No  STM ordered by provider: Yes     Total time spent on accessing and  interpreting remote patient PAP therapy data  10 minutes    Total time spent counseling, coaching  and reviewing PAP therapy data with patient  8 minutes    42014 no

## 2025-04-01 ENCOUNTER — VIRTUAL VISIT (OUTPATIENT)
Dept: PSYCHIATRY | Facility: CLINIC | Age: 60
End: 2025-04-01
Payer: COMMERCIAL

## 2025-04-01 DIAGNOSIS — G47.00 INSOMNIA, UNSPECIFIED TYPE: Primary | ICD-10-CM

## 2025-04-01 RX ORDER — MIRTAZAPINE 7.5 MG/1
7.5 TABLET, FILM COATED ORAL AT BEDTIME
Qty: 30 TABLET | Refills: 1 | Status: SHIPPED | OUTPATIENT
Start: 2025-04-01

## 2025-04-01 NOTE — NURSING NOTE
Current patient location: [address] [place] [address]    Is the patient currently in the state of [place]? YES    Visit mode: TELEPHONE    If the visit is dropped, the patient can be reconnected by:TELEPHONE VISIT: Phone number:   Telephone Information:   Mobile [phone]       Will anyone else be joining the visit? NO  (If patient encounters technical issues they should call [phone] :[phone])    Are changes needed to the allergy or medication list? No    Are refills needed on medications prescribed by this physician? Discuss with provider    Rooming Documentation:  Questionnaire(s) completed    Reason for visit: RECHECK    Jasmeet BYRNE
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
## 2025-04-02 ENCOUNTER — THERAPY VISIT (OUTPATIENT)
Dept: OCCUPATIONAL THERAPY | Facility: REHABILITATION | Age: 60
End: 2025-04-02
Payer: COMMERCIAL

## 2025-04-02 DIAGNOSIS — M19.042 ARTHRITIS OF BOTH HANDS: Primary | ICD-10-CM

## 2025-04-02 DIAGNOSIS — M19.041 ARTHRITIS OF BOTH HANDS: Primary | ICD-10-CM

## 2025-04-02 PROCEDURE — T1013 SIGN LANG/ORAL INTERPRETER: HCPCS | Mod: U4 | Performed by: CHIROPRACTOR

## 2025-04-02 PROCEDURE — 97760 ORTHOTIC MGMT&TRAING 1ST ENC: CPT | Mod: GO

## 2025-04-02 PROCEDURE — 97535 SELF CARE MNGMENT TRAINING: CPT | Mod: GO

## 2025-04-08 DIAGNOSIS — F33.1 MODERATE EPISODE OF RECURRENT MAJOR DEPRESSIVE DISORDER (H): ICD-10-CM

## 2025-04-09 ENCOUNTER — DOCUMENTATION ONLY (OUTPATIENT)
Dept: SLEEP MEDICINE | Facility: CLINIC | Age: 60
End: 2025-04-09
Payer: COMMERCIAL

## 2025-04-09 LAB — NONINV COLON CA DNA+OCC BLD SCRN STL QL: NEGATIVE

## 2025-04-09 RX ORDER — LURASIDONE HYDROCHLORIDE 20 MG/1
20 TABLET, FILM COATED ORAL
Qty: 30 TABLET | Refills: 1 | Status: SHIPPED | OUTPATIENT
Start: 2025-04-09

## 2025-04-29 ENCOUNTER — DOCUMENTATION ONLY (OUTPATIENT)
Dept: SLEEP MEDICINE | Facility: CLINIC | Age: 60
End: 2025-04-29
Payer: COMMERCIAL

## 2025-04-29 NOTE — PROGRESS NOTES
30 DAY [facility] VISIT    Diagnostic AHI: PS.6         Subjective measures:   Spoke with patient through  services he stated he is doing well with CPAP. He will try and use CPAP four hours or greater each day and night.  Patient will call if he has questions or concerns.      Assessment: Pt not meeting objective benchmarks for compliance  Patient meeting subjective benchmarks.   Patient has the following upcoming sleep appts:  Future Sleep Appointments         Provider Department    2025 1:00 PM (Arrive by 12:45 PM) Kranthi Parra APRN [facility]          Device type: Auto-CPAP  PAP settings: CPAP min 10.0 cm  H20     CPAP max 17.0 cm  H20    95th% pressure 12.2 cm  H20      RESMED EPR level Setting: TWO    RESMED Soft response setting:  OFF  Mask type:    Objective measures: 14 day rolling measures      Compliance  57 %      Leak  19.2 lpm  last  upload      AHI 2.97   last  upload      Average number of minutes 276      Objective measure goal  Compliance   Goal >70%  Leak   Goal < 24 lpm  AHI  Goal < 5  Usage  Goal >240        Total time spent on accessing and interpreting remote patient PAP therapy data  10 minutes    Total time spent counseling, coaching  and reviewing PAP therapy data with patient  9 minutes     [id number] this call  76012 no  at 3 or 14 day [facility]

## 2025-05-05 ENCOUNTER — PATIENT OUTREACH (OUTPATIENT)
Dept: CARE COORDINATION | Facility: CLINIC | Age: 60
End: 2025-05-05
Payer: COMMERCIAL

## 2025-05-05 NOTE — PROGRESS NOTES
Clinic Care Coordination Contact  Community Health Worker Follow Up    Care Gaps:     Health Maintenance Due   Topic Date Due    DIABETIC FOOT EXAM  Never done    ADVANCE CARE PLANNING  Never done    DEPRESSION ACTION PLAN  Never done    HEPATITIS B IMMUNIZATION (1 of 3 - 19+ 3-dose series) Never done    DTAP/TDAP/TD IMMUNIZATION (3 - Tdap) 11/21/1987    ZOSTER IMMUNIZATION (1 of 2) Never done       Care Gaps Last addressed on 5/5/2025    Care Plan:   Care Plan: Financial Wellbeing       Problem: Patient expresses financial resource strain       Long-Range Goal: Apply for Social Security Disability.       Start Date: 10/18/2024 Expected End Date: 10/17/2025    This Visit's Progress: 50% Recent Progress: 50%    Priority: High    Note:     Barriers: long process, stressed, anxiety is high, language barrier.  Strengths: motivated, has some family support.  Patient expressed understanding of goal: yes  Action steps to achieve this goal:  1. I will work with [employer] to see if I can apply for Disability through Social Security.   2. I will complete application process.   3. I will report progress towards this goal at scheduled outreach telephone calls from the CCC team.    5/5 discussed, Jerson still waiting for update  4/4 discussed, had appt last week 3/28- waiting for update  3/5- discussed, patient has evaluation scheduled 3/10 and follow up 3/25  2/5 discussed, pt received call from [facility] to schedule an evaluation with their SW/Medical team- no appt scheduled at this time  1/6 discussed, paperwork has been submitted, no update at this time  11/22- discussed with patient, states he has started the papework                               Care Plan: Mental Health       Problem: Mental Health Symptoms Need Improvement       Long-Range Goal: Improve management of mental health symptoms and establish with mental health/psychosocial supports       Start Date: 10/18/2024 Expected End Date: 10/17/2025     Recent Progress: 50%    Priority: High    Note:     Barriers: very stressed, anxiety.  Strengths: motivated, engaging with care team, support from daughter.  Patient expressed understanding of goal: yes  Action steps to achieve this goal:  1. I will work with [facility] to get support from therapy, and medication management.  2. I will work with [facility] [employer] program and will learn new skills.  3. I will report progress towards this goal at scheduled outreach telephone calls from the [facility] team.    5/5 discussed no concerns  4/4- discussed, currently working with [facility] worker- doing well  3/5- discussed has follow up call later in month  2/5 discussed, next appt scheduled in February 1/6/25- discussed, next appt on 1/10/25  11/22- discussed with patient, has appt scheduled on 12/19 at 1:00 pm                              Care Plan: General       Problem: HP GENERAL PROBLEM       Goal: I will work with my PCP to see if I can get a handicapped parking card.       Start Date: 2/10/2025 Expected End Date: 7/1/2025    This Visit's Progress: 30% Recent Progress: 20%    Priority: High    Note:     Barriers: none noted.   Strengths: motivated.  Patient expressed understanding of goal: yes  Action steps to achieve this goal:  1. I will work with my PCP to answer any questions.   2. I will look for mail from [facility].  3. I will report progress towards this goal at scheduled outreach telephone calls from the [facility] team.    5/5-discussed, per request CHW will mail another copy of placard application   4/4- discussed, no update- pt has not completed paperwork- CHW will alert PCP and send paperwork to patient via mail  3/5- per chart review was sent to Dr. Castellon on 2/19- per patient he still does not have placard- CHW will send message to Dr. Castellon today to see if there is any update                              Intervention and Education during outreach:   Supportive Listening    CHW spoke to Jerson barnett for monthly CC outreach  call, patient states he is doing well, reports he is tired and weak at times.  CC goals were discussed during today's call    Social Security Disability- No update at this time, had meeting on 3/28 waiting to hear back    MH Support: Continues to work with [facility] worker, no concerns    Handicapped Parking Placard- Application was mailed to patient earlier this month, patient is requesting another one- CHW will mail out application.    CHW Plan: Continue with monthly outreach call to discuss, support and update CC goal progression    Next CHW outreach call: 6/4/2025    Vinita BOYLE  Community Health Worker  [facility] Care Coordination  [place]omin[place] [email or web address]  [email or web address]  Office: [phone]

## 2025-06-09 ENCOUNTER — PATIENT OUTREACH (OUTPATIENT)
Dept: CARE COORDINATION | Facility: CLINIC | Age: 60
End: 2025-06-09
Payer: COMMERCIAL

## 2025-06-09 NOTE — Clinical Note
Sha Watts, you can review the note for details.  It seems like he doesn't have or know all the meds he is supposed to be taking if his med list is accurate.  Thanks, Celi

## 2025-06-09 NOTE — PROGRESS NOTES
Clinic Care Coordination Contact  Follow Up Progress Note      Assessment: call from patient and Dayana from [employer] and . Dayana is helping with his disability application and wanted to get list of medication and diagnosis. Patient told her that he only had six medications.  Patient agreed to have this information shared with Dayana.      Care Gaps:    Health Maintenance Due   Topic Date Due    DIABETIC FOOT EXAM  Never done    ADVANCE CARE PLANNING  Never done    DEPRESSION ACTION PLAN  Never done    HEPATITIS B VACCINE (1 of 3 - 19+ 3-dose series) Never done    DTAP/TDAP/TD VACCINE (3 - Tdap) 11/21/1987    ZOSTER VACCINE (1 of 2) Never done    DEPRESSION 12 MO INDEX REPEAT PHQ-9  06/08/2025       Postponed to next pcp appt     Care Plans  Care Plan: Financial Wellbeing       Problem: Patient expresses financial resource strain       Long-Range Goal: Apply for Social Security Disability.       Start Date: 10/18/2024 Expected End Date: 10/17/2025    This Visit's Progress: 60% Recent Progress: 60%    Priority: High    Note:     Barriers: long process, stressed, anxiety is high, language barrier.  Strengths: motivated, has some family support.  Patient expressed understanding of goal: yes  Action steps to achieve this goal:  1. I will work with Disability Specialists to see if I can apply for Disability through Social Security.   2. I will complete application process.   3. I will report progress towards this goal at scheduled outreach telephone calls from the [facility] team.  5-30 Was denied, DS helping with appeal  5/5 discussed, Christae still waiting for update  4/4 discussed, had appt last week 3/28- waiting for update  3/5- discussed, patient has evaluation scheduled 3/10 and follow up 3/25  2/5 discussed, pt received call from [facility] to schedule an evaluation with their SW/Medical team- no appt scheduled at this time  1/6 discussed, paperwork has been submitted, no update at this  time  11/22- discussed with patient, states he has started the papework                               Care Plan: Mental Health       Problem: Mental Health Symptoms Need Improvement       Long-Range Goal: Improve management of mental health symptoms and establish with mental health/psychosocial supports       Start Date: 10/18/2024 Expected End Date: 10/17/2025    Recent Progress: 50%    Priority: High    Note:     Barriers: very stressed, anxiety.  Strengths: motivated, engaging with care team, support from daughter.  Patient expressed understanding of goal: yes  Action steps to achieve this goal:  1. I will work with [facility] to get support from therapy, and medication management.  2. I will work with [facility] program and will learn new skills.  3. I will report progress towards this goal at scheduled outreach telephone calls from the [facility] team.    5/5 discussed no concerns  4/4- discussed, currently working with [facility] worker- doing well  3/5- discussed has follow up call later in month  2/5 discussed, next appt scheduled in February 1/6/25- discussed, next appt on 1/10/25  11/22- discussed with patient, has appt scheduled on 12/19 at 1:00 pm                              Care Plan: General       Problem: HP GENERAL PROBLEM       Goal: I will work with my PCP to see if I can get a handicapped parking card.       Start Date: 2/10/2025 Expected End Date: 7/1/2025    This Visit's Progress: 30% Recent Progress: 20%    Priority: High    Note:     Barriers: none noted.   Strengths: motivated.  Patient expressed understanding of goal: yes  Action steps to achieve this goal:  1. I will work with my PCP to answer any questions.   2. I will look for mail from [facility].  3. I will report progress towards this goal at scheduled outreach telephone calls from the [facility] team.    5/5-discussed, per request CHW will mail another copy of placard application   4/4- discussed, no update- pt has not completed paperwork- CHW  will alert PCP and send paperwork to patient via mail  3/5- per chart review was sent to Dr. Castellon on 2/19- per patient he still does not have placard- CHW will send message to Dr. Castellon today to see if there is any update                              Intervention/Education provided during outreach: help with application.     Outreach Frequency: monthly, more frequently as needed      Plan:   Leaving delegation in place as patient did not discuss other goals during the call.   Care Coordinator will follow up in six weeks and as needed.  Celi Hernandez,   [facility]  [phone]

## 2025-06-10 ENCOUNTER — APPOINTMENT (OUTPATIENT)
Dept: INTERPRETER SERVICES | Facility: CLINIC | Age: 60
End: 2025-06-10
Payer: COMMERCIAL

## 2025-06-10 ENCOUNTER — PATIENT OUTREACH (OUTPATIENT)
Dept: CARE COORDINATION | Facility: CLINIC | Age: 60
End: 2025-06-10
Payer: COMMERCIAL

## 2025-06-10 ASSESSMENT — ACTIVITIES OF DAILY LIVING (ADL)
DEPENDENT_IADLS:: INDEPENDENT
DEPENDENT_IADLS:: INDEPENDENT

## 2025-06-10 NOTE — PROGRESS NOTES
Clinic Care Coordination Contact  Follow Up Progress Note      Assessment: Talked to patient using . He is seeing a therapist from [facility] and wants to get medication management. Reviewed chart and he has been getting that support from [facility]. Last visit on 4-1. He did not have a follow up appt and tried to set up appt through intake, but hold time was too long. Will route a message to provider to get assistance with scheduling that.  He is not sure if he still has [facility] worker from [facility]. After call, sent email to Em at [facility] to see if he is still open to service.      His daughter sets up his medication and calls him to remind him to take them. He doesn't think he would benefit from a home nurse.      Frustrated with the long wait to apply for Social Security.      Care Gaps:    Health Maintenance Due   Topic Date Due    DIABETIC FOOT EXAM  Never done    ADVANCE CARE PLANNING  Never done    DEPRESSION ACTION PLAN  Never done    HEPATITIS B VACCINE (1 of 3 - 19+ 3-dose series) Never done    DTAP/TDAP/TD VACCINE (3 - Tdap) 11/21/1987    ZOSTER VACCINE (1 of 2) Never done    DEPRESSION 12 MO INDEX REPEAT PHQ-9  06/08/2025       Postponed to next appt     Care Plans  Care Plan: Financial Wellbeing       Problem: Patient expresses financial resource strain       Long-Range Goal: Apply for Social Security Disability.       Start Date: 10/18/2024 Expected End Date: 10/17/2025    This Visit's Progress: 60% Recent Progress: 60%    Priority: High    Note:     Barriers: long process, stressed, anxiety is high, language barrier.  Strengths: motivated, has some family support.  Patient expressed understanding of goal: yes  Action steps to achieve this goal:  1. I will work with [employer]ability [employer]ists to see if I can apply for Disability through Social Security.   2. I will complete application process.   3. I will report progress towards this goal at scheduled outreach telephone calls from the CCC  team.  5-30 Was denied, DS helping with appeal  5/5 discussed, Jerson still waiting for update  4/4 discussed, had appt last week 3/28- waiting for update  3/5- discussed, patient has evaluation scheduled 3/10 and follow up 3/25  2/5 discussed, pt received call from [employer] to schedule an evaluation with their SW/Medical team- no appt scheduled at this time  1/6 discussed, paperwork has been submitted, no update at this time  11/22- discussed with patient, states he has started the papework                               Care Plan: Mental Health       Problem: Mental Health Symptoms Need Improvement       Long-Range Goal: Improve management of mental health symptoms and establish with mental health/psychosocial supports       Start Date: 10/18/2024 Expected End Date: 10/17/2025    This Visit's Progress: 50% Recent Progress: 50%    Priority: High    Note:     Barriers: very stressed, anxiety.  Strengths: motivated, engaging with care team, support from daughter.  Patient expressed understanding of goal: yes  Action steps to achieve this goal:  1. I will work with [facility] to get support from therapy, and medication management.  2. I will work with [facility] [employer] program and will learn new skills.  3. I will report progress towards this goal at scheduled outreach telephone calls from the [facility] team.  6-10 has LANA Seay, therapist at [facility], Psych through [facility]  5/5 discussed no concerns  4/4- discussed, currently working with [facility] worker- doing well  3/5- discussed has follow up call later in month  2/5 discussed, next appt scheduled in February 1/6/25- discussed, next appt on 1/10/25  11/22- discussed with patient, has appt scheduled on 12/19 at 1:00 pm                              Care Plan: General       Problem: HP GENERAL PROBLEM       Goal: I will work with my PCP to see if I can get a handicapped parking card.       Start Date: 2/10/2025 Expected End Date: 7/1/2025    This Visit's  Progress: 40% Recent Progress: 30%    Priority: High    Note:     Barriers: none noted.   Strengths: motivated.  Patient expressed understanding of goal: yes  Action steps to achieve this goal:  1. I will work with my PCP to answer any questions.   2. I will look for mail from [employer].  3. I will report progress towards this goal at scheduled outreach telephone calls from the [facility] team.    5/5-discussed, per request CHW will mail another copy of placard application   4/4- discussed, no update- pt has not completed paperwork- CHW will alert PCP and send paperwork to patient via mail  3/5- per chart review was sent to Dr. Castellon on 2/19- per patient he still does not have placard- CHW will send message to Dr. Castellon today to see if there is any update                              Intervention/Education provided during outreach: support     Outreach Frequency: monthly, more frequently as needed      Plan:   Delegating to CHW to make sure a psych appt is scheduled within one week. If not, assist patient in setting it up.    Care Coordinator will follow up in 6 weeks and as needed.

## 2025-06-18 ENCOUNTER — OFFICE VISIT (OUTPATIENT)
Dept: PHARMACY | Facility: CLINIC | Age: 60
End: 2025-06-18
Payer: COMMERCIAL

## 2025-06-18 DIAGNOSIS — F33.1 MODERATE EPISODE OF RECURRENT MAJOR DEPRESSIVE DISORDER (H): ICD-10-CM

## 2025-06-18 DIAGNOSIS — M25.512 CHRONIC PAIN OF BOTH SHOULDERS: ICD-10-CM

## 2025-06-18 DIAGNOSIS — F41.9 ANXIETY: ICD-10-CM

## 2025-06-18 DIAGNOSIS — E11.9 TYPE 2 DIABETES MELLITUS WITHOUT COMPLICATION, WITHOUT LONG-TERM CURRENT USE OF INSULIN (H): Primary | ICD-10-CM

## 2025-06-18 DIAGNOSIS — M25.511 CHRONIC PAIN OF BOTH SHOULDERS: ICD-10-CM

## 2025-06-18 DIAGNOSIS — G89.29 CHRONIC PAIN OF BOTH SHOULDERS: ICD-10-CM

## 2025-06-18 PROCEDURE — 99606 MTMS BY PHARM EST 15 MIN: CPT

## 2025-06-18 RX ORDER — FLUOXETINE HYDROCHLORIDE 40 MG/1
40 CAPSULE ORAL DAILY
Qty: 90 CAPSULE | Refills: 3 | Status: SHIPPED | OUTPATIENT
Start: 2025-06-18

## 2025-06-18 RX ORDER — PRAZOSIN HYDROCHLORIDE 2 MG/1
2 CAPSULE ORAL AT BEDTIME
Qty: 30 CAPSULE | Refills: 3 | Status: SHIPPED | OUTPATIENT
Start: 2025-06-18

## 2025-06-18 RX ORDER — MIRTAZAPINE 7.5 MG/1
7.5 TABLET, FILM COATED ORAL AT BEDTIME
Qty: 30 TABLET | Refills: 1 | Status: SHIPPED | OUTPATIENT
Start: 2025-06-18

## 2025-06-18 NOTE — PATIENT INSTRUCTIONS
"Recommendations from today's MTM visit:                                                      MTM (medication therapy management) is a service provided by a clinical pharmacist designed to help you get the most of out of your medicines.   Today we reviewed what your medicines are for, how to know if they are working, that your medicines are safe and how to make your medicine regimen as easy as possible.      PharmD to communicate with Dr. Frey to order X ray   PharmD to check if disability permit application form is filled or not   Please come on Friday 06/20/2025 @ 10:30 AM to see Dr. Silva  Start taking olanzapine 5 mg at bedtime   Please bring all your medications with you     Follow-up: Due on 07/22/2025 @ 11 AM     It was great speaking with you today.  I value your experience and would be very thankful for your time in providing feedback in our clinic survey. In the next few days, you may receive an email or text message from [facility] with a link to a survey related to your  clinical pharmacist.\"     To schedule another MTM appointment, please call the clinic directly or you may call the MTM scheduling line at [phone].    My Clinical Pharmacist's contact information:                                                      Please feel free to contact me with any questions or concerns you have.        Stefanie Funk, PharmD     Medication Therapy Management (MTM) Pharmacist     [phone]     [email or web address]     [facility]    "

## 2025-06-18 NOTE — PROGRESS NOTES
Medication Therapy Management (MTM) Encounter    ASSESSMENT:                            Medication Adherence/Access:  Patient seem to not remember some of his medications.      Diabetes Type II/weight loss: A1c within goal of < 7%. Most of the CGM readings are in the target range (70 - 180) 65% below goal of > 70%. Patient's sensor has not bee working recently. However, his BG might have increased due to shoulder pain. Will follow up closely.     Mental Health   Anxiety, Depression, and Insomnia: Since mounjaro was discontinued, patient's ED and incontinence improved. Patient is following Psych.     Knee and Shoulder Pain: Shoulder pain has been elevated recently, and pain medications are not helping. Unsure what pain medication patient is using (have difficult remembering his medications; daughter helps him).  There is a recommendation of doing X ray to assess what is going on his shoulders. Patient has an appointment with Dr. Silva tomorrow (06/20/2025). We defer treatment to PCP, and assessing provider (Dr. Silva).     PLAN:                            PharmD to communicate with Dr. Frey to order X ray   PharmD to check if disability permit application form is filled or not   Please come on Friday 06/20/2025 @ 10:30 AM to see Dr. Silva  Start taking olanzapine 5 mg at bedtime   Please bring all your medications with you     Follow-up: Due on 07/22/2025 @ 11 AM     SUBJECTIVE/OBJECTIVE:                          Jerson Lynn is a 60 year old male seen for a follow up visit. [employer] was used (Name: Benita,  ID: [id number])     Reason for visit: Medication Review and Diabetes Type II.     Allergies/ADRs: Reviewed in chart  Past Medical History: Reviewed in chart  Tobacco: He reports that he has never smoked. He has never been exposed to tobacco smoke. He has never used smokeless tobacco.  Alcohol: none  Medication Adherence/Access: Patient seem to not remember some of his medications.     Diabetes Type II:        "        Jardiance 25 mg daily               Metformin 1000 mg BID               Aspirin 81mg daily  Patient noted he have some fogginess;he is having some delayed thinking.  Current diabetes symptoms:None  Blood sugar monitoring: Check CGM below  Diet/Exercise: Wanted to walk during the summer and check how that affects his BG and BP.   Eye exam is up to date  Foot exam: Due  Estimated body mass index is 37.15 kg/m  as calculated from the following:    Height as of 10/30/24: 5' 3\" (1.6 m).    Weight as of 10/30/24: 209 lb 11.2 oz (95.1 kg).     Lab Results   Component Value Date    A1C 6.5 03/26/2025    A1C 7.2 10/30/2024             Mental Health  Anxiety, Depression, and Insomnia:               Fluoxetine 60 mg daily  Mirtazapine 7.5 mg daily    Patient reports no current medication side effects.   Previously, patient was active, but went through divorce and that has affected his physical and mental health. This has been going on for the last 10 years. Recently Psych increase fluoxetine dose from 40 to 60 mg and noted his mood improved and he is sleeping better.    - Patient noted he is not sleeping well at night due to getting shortness of breath. Patient is going see a sleep doctor and he will be ordered a CPAP machine to help him breath overnight.    - Patient is complained of his penis not been erecting even for urinating. This might be the side effect of the high dose Fluoxetine. Psych thinks this could be likely due to mounjaro. Patient noted this condition started when he started taking the mounjaro and also increased dose of fluoxetine. Patient noted after stopped taking mounjaro he got his strength back to urinate and he is very happy about it. We will discontinue mounjaro.      Patient noted recently his mood is getting worse and likely this is due to shoulder pain.       Knee and Shoulder Pain:    Noted he went to Urgent care and got some pain medication, which he said did not help. He seems not to " remember the name of the pain medication. He wanted to do X ray to check what is going on with his shoulder pain.  Will help patient make an appointment with provider as soon as possible to assess his shoulder pain and do diagnostic assessment.    Today's Vitals: There were no vitals taken for this visit.  ----------------      I spent 60 minutes with this patient today. All changes were made via collaborative practice agreement with Clemente Frey MD.     A summary of these recommendations was given to the patient.       Medication Therapy Recommendations  No medication therapy recommendations to display     Stefanie Funk, PharmD     Medication Therapy Management (MTM) Pharmacist     [phone]     [email or web address]

## 2025-06-19 ENCOUNTER — TELEPHONE (OUTPATIENT)
Dept: INTERNAL MEDICINE | Facility: CLINIC | Age: 60
End: 2025-06-19
Payer: COMMERCIAL

## 2025-06-19 NOTE — TELEPHONE ENCOUNTER
June 19, 2025    Handicap parking permit was received via fax for Dr. Frey.  Patient label was attached to paperwork and placed in provider's inbox to be signed.    Liliana Angelo

## 2025-06-20 ENCOUNTER — ANCILLARY PROCEDURE (OUTPATIENT)
Dept: GENERAL RADIOLOGY | Facility: CLINIC | Age: 60
End: 2025-06-20
Attending: INTERNAL MEDICINE
Payer: COMMERCIAL

## 2025-06-20 DIAGNOSIS — M25.511 ACUTE PAIN OF RIGHT SHOULDER: ICD-10-CM

## 2025-06-20 PROCEDURE — 73030 X-RAY EXAM OF SHOULDER: CPT | Mod: TC | Performed by: RADIOLOGY

## 2025-06-24 ENCOUNTER — TELEPHONE (OUTPATIENT)
Dept: ORTHOPEDICS | Facility: CLINIC | Age: 60
End: 2025-06-24
Payer: COMMERCIAL

## 2025-06-24 ENCOUNTER — APPOINTMENT (OUTPATIENT)
Dept: INTERPRETER SERVICES | Facility: CLINIC | Age: 60
End: 2025-06-24
Payer: COMMERCIAL

## 2025-06-24 NOTE — TELEPHONE ENCOUNTER
After chart review, patient had an MRI ordered for his right shoulder.    Outbound call with [facility] .    Mr Shane should complete his MRI of right shoulder.  No sooner than 3 days after MRI, appointment with Fitz Anders PA-C should be scheduled to review results.    Patient understands and appreciates the call.  Inter will help patient schedule MRI and then will schedule with Fitz Anders PA-C, 3 days later for results.    No further action is required.    Amber Mason MA

## 2025-06-25 ENCOUNTER — VIRTUAL VISIT (OUTPATIENT)
Dept: PSYCHIATRY | Facility: CLINIC | Age: 60
End: 2025-06-25
Payer: COMMERCIAL

## 2025-06-25 ENCOUNTER — TELEPHONE (OUTPATIENT)
Dept: INTERNAL MEDICINE | Facility: CLINIC | Age: 60
End: 2025-06-25

## 2025-06-25 VITALS — SYSTOLIC BLOOD PRESSURE: 132 MMHG | WEIGHT: 180 LBS | DIASTOLIC BLOOD PRESSURE: 90 MMHG | BODY MASS INDEX: 32.91 KG/M2

## 2025-06-25 DIAGNOSIS — F33.1 MODERATE EPISODE OF RECURRENT MAJOR DEPRESSIVE DISORDER (H): Primary | ICD-10-CM

## 2025-06-25 DIAGNOSIS — F41.9 ANXIETY: ICD-10-CM

## 2025-06-25 DIAGNOSIS — G47.00 INSOMNIA, UNSPECIFIED TYPE: ICD-10-CM

## 2025-06-25 RX ORDER — MIRTAZAPINE 15 MG/1
15 TABLET, FILM COATED ORAL AT BEDTIME
Qty: 30 TABLET | Refills: 1 | Status: SHIPPED | OUTPATIENT
Start: 2025-06-25

## 2025-06-25 RX ORDER — OLANZAPINE 5 MG/1
5 TABLET, FILM COATED ORAL AT BEDTIME
Qty: 30 TABLET | Refills: 0 | Status: SHIPPED | OUTPATIENT
Start: 2025-06-25

## 2025-06-25 ASSESSMENT — PAIN SCALES - GENERAL: PAINLEVEL_OUTOF10: SEVERE PAIN (8)
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
## 2025-06-25 NOTE — TELEPHONE ENCOUNTER
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  Patient label was attached to paperwork and placed in provider's inbox to be signed.    Liliana Angelo

## 2025-06-25 NOTE — NURSING NOTE
Is the patient currently in the state of [place]? YES    Current patient location: [address] [place] [address]    Visit mode:Video    If the visit is dropped, the patient can be reconnected by: VIDEO VISIT: Text to cell phone:   Telephone Information:   Mobile [phone]       Will anyone else be joining the visit? No  (If patient encounters technical issues they should call [phone])    Are changes needed to the allergy or medication list? No    Are refills needed on medications prescribed by this physician? Yes    Rooming Documentation: Questionnaire(s) completed.    Reason for visit: RECHCATY Brown         No indicators present

## 2025-06-28 ENCOUNTER — HEALTH MAINTENANCE LETTER (OUTPATIENT)
Age: 60
End: 2025-06-28

## 2025-07-02 ASSESSMENT — ANXIETY QUESTIONNAIRES
3. WORRYING TOO MUCH ABOUT DIFFERENT THINGS: NEARLY EVERY DAY
6. BECOMING EASILY ANNOYED OR IRRITABLE: MORE THAN HALF THE DAYS
IF YOU CHECKED OFF ANY PROBLEMS ON THIS QUESTIONNAIRE, HOW DIFFICULT HAVE THESE PROBLEMS MADE IT FOR YOU TO DO YOUR WORK, TAKE CARE OF THINGS AT HOME, OR GET ALONG WITH OTHER PEOPLE: VERY DIFFICULT
GAD7 TOTAL SCORE: 20
GAD7 TOTAL SCORE: 20
8. IF YOU CHECKED OFF ANY PROBLEMS, HOW DIFFICULT HAVE THESE MADE IT FOR YOU TO DO YOUR WORK, TAKE CARE OF THINGS AT HOME, OR GET ALONG WITH OTHER PEOPLE?: VERY DIFFICULT
2. NOT BEING ABLE TO STOP OR CONTROL WORRYING: NEARLY EVERY DAY
5. BEING SO RESTLESS THAT IT IS HARD TO SIT STILL: NEARLY EVERY DAY
7. FEELING AFRAID AS IF SOMETHING AWFUL MIGHT HAPPEN: NEARLY EVERY DAY
4. TROUBLE RELAXING: NEARLY EVERY DAY
1. FEELING NERVOUS, ANXIOUS, OR ON EDGE: NEARLY EVERY DAY

## 2025-07-03 ENCOUNTER — VIRTUAL VISIT (OUTPATIENT)
Dept: PSYCHIATRY | Facility: CLINIC | Age: 60
End: 2025-07-03
Payer: COMMERCIAL

## 2025-07-03 DIAGNOSIS — G47.00 INSOMNIA, UNSPECIFIED TYPE: ICD-10-CM

## 2025-07-03 DIAGNOSIS — F43.10 PTSD (POST-TRAUMATIC STRESS DISORDER): Primary | ICD-10-CM

## 2025-07-03 DIAGNOSIS — F33.1 MODERATE EPISODE OF RECURRENT MAJOR DEPRESSIVE DISORDER (H): ICD-10-CM

## 2025-07-03 DIAGNOSIS — F41.9 ANXIETY: ICD-10-CM

## 2025-07-03 PROCEDURE — 1125F AMNT PAIN NOTED PAIN PRSNT: CPT | Mod: 95 | Performed by: STUDENT IN AN ORGANIZED HEALTH CARE EDUCATION/TRAINING PROGRAM

## 2025-07-03 PROCEDURE — 98006 SYNCH AUDIO-VIDEO EST MOD 30: CPT | Performed by: STUDENT IN AN ORGANIZED HEALTH CARE EDUCATION/TRAINING PROGRAM

## 2025-07-03 PROCEDURE — G2211 COMPLEX E/M VISIT ADD ON: HCPCS | Mod: 95 | Performed by: STUDENT IN AN ORGANIZED HEALTH CARE EDUCATION/TRAINING PROGRAM

## 2025-07-03 ASSESSMENT — PAIN SCALES - GENERAL: PAINLEVEL_OUTOF10: SEVERE PAIN (7)
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
## 2025-07-03 NOTE — NURSING NOTE
Current patient location: [address] [place] [address]    Is the patient currently in the state of [place]? YES    Visit mode: VIDEO    If the visit is dropped, the patient can be reconnected by:VIDEO VISIT: Text to cell phone:   Telephone Information:   Mobile [phone]     [facility] : Mi    Will anyone else be joining the visit? NO  (If patient encounters technical issues they should call [phone])    Are changes needed to the allergy or medication list? Pt stated no changes to allergies and Pt stated no med changes    Are refills needed on medications prescribed by this physician? Discuss with provider    Rooming Documentation:  Questionnaire(s) not pre-assigned    Reason for visit: KAMINI MARTIF

## 2025-07-05 ENCOUNTER — PATIENT OUTREACH (OUTPATIENT)
Dept: CARE COORDINATION | Facility: CLINIC | Age: 60
End: 2025-07-05
Payer: COMMERCIAL

## 2025-07-07 ENCOUNTER — PATIENT OUTREACH (OUTPATIENT)
Dept: CARE COORDINATION | Facility: CLINIC | Age: 60
End: 2025-07-07
Payer: COMMERCIAL

## 2025-07-08 ENCOUNTER — PATIENT OUTREACH (OUTPATIENT)
Dept: CARE COORDINATION | Facility: CLINIC | Age: 60
End: 2025-07-08

## 2025-07-08 NOTE — PROGRESS NOTES
Clinic Care Coordination Contact  Community Health Worker Follow Up    Care Gaps:     Health Maintenance Due   Topic Date Due    DIABETIC FOOT EXAM  Never done    ADVANCE CARE PLANNING  Never done    DEPRESSION ACTION PLAN  Never done    HEPATITIS B VACCINE (1 of 3 - 19+ 3-dose series) Never done    DTAP/TDAP/TD VACCINE (3 - Tdap) 11/21/1987    ZOSTER VACCINE (1 of 2) Never done    A1C  06/26/2025    DEPRESSION 12 MO INDEX REPEAT PHQ-9  07/04/2025    YEARLY PREVENTIVE VISIT  08/07/2025    MICROALBUMIN  08/07/2025       Care Gaps Last addressed on 7/8/2025, Virtual PCP appt is scheduled 7/10/2025 at 4:30 pm    Care Plan:   Care Plan: Financial Wellbeing       Problem: Patient expresses financial resource strain       Long-Range Goal: Apply for Social Security Disability.       Start Date: 10/18/2024 Expected End Date: 10/17/2025    This Visit's Progress: 60% Recent Progress: 60%    Priority: High    Note:     Barriers: long process, stressed, anxiety is high, language barrier.  Strengths: motivated, has some family support.  Patient expressed understanding of goal: yes  Action steps to achieve this goal:  1. I will work with Disability Specialists to see if I can apply for Disability through Social Security.   2. I will complete application process. (Denied, plan to appeal decision)  3. I will report progress towards this goal at scheduled outreach telephone calls from the [facility] team.    7/8- no update  5-30 Was denied, DS helping with appeal  5/5 discussed, Jerson still waiting for update  4/4 discussed, had appt last week 3/28- waiting for update  3/5- discussed, patient has evaluation scheduled 3/10 and follow up 3/25  2/5 discussed, pt received call from [email or web address] to schedule an evaluation with their SW/Medical team- no appt scheduled at this time  1/6 discussed, paperwork has been submitted, no update at this time  11/22- discussed with patient, states he has started the papework                                Care Plan: Mental Health       Problem: Mental Health Symptoms Need Improvement       Long-Range Goal: Improve management of mental health symptoms and establish with mental health/psychosocial supports       Start Date: 10/18/2024 Expected End Date: 10/17/2025    This Visit's Progress: 60% Recent Progress: 50%    Priority: High    Note:     Barriers: very stressed, anxiety.  Strengths: motivated, engaging with care team, support from daughter.  Patient expressed understanding of goal: yes  Action steps to achieve this goal:  1. I will work with Geena to get support from therapy, and medication management.(Therapist established with [facility])  2. I will work with [facility] program and will learn new skills. (On-going)  3. I will report progress towards this goal at scheduled outreach telephone calls from the [facility] team.(Continuously)    7/8 discussed/updated  6-10 has LANA Seay, therapist at [facility], Psych through [facility]  5/5 discussed no concerns  4/4- discussed, currently working with [facility] [facility] worker- doing well  3/5- discussed has follow up call later in month  2/5 discussed, next appt scheduled in February 1/6/25- discussed, next appt on 1/10/25  11/22- discussed with patient, has appt scheduled on 12/19 at 1:00 pm                              Care Plan: General       Problem: HP GENERAL PROBLEM       Goal: I will work with my PCP to see if I can get a handicapped parking card.       Start Date: 2/10/2025 Expected End Date: 7/1/2025    This Visit's Progress: 50% Recent Progress: 40%    Priority: High    Note:     Barriers: none noted.   Strengths: motivated.  Patient expressed understanding of goal: yes  Action steps to achieve this goal:  1. I will work with my PCP to answer any questions.(Next virtual appt 7/10)  2. I will look for mail from [facility].  3. I will report progress towards this goal at scheduled outreach telephone calls from the CCC team.(Continuously)    7/8- plan to locate  application, virtual visit PCP 7/10  5/5-discussed, per request CHW will mail another copy of placard application   4/4- discussed, no update- pt has not completed paperwork- CHW will alert PCP and send paperwork to patient via mail  3/5- per chart review was sent to Dr. Castellon on 2/19- per patient he still does not have placard- CHW will send message to Dr. Castellon today to see if there is any update                              Intervention and Education during outreach:   CHW and patient discussed all upcoming scheduled appts, Jerson looking forward to discussing anxiety with MRI with PCP    Christae states he is doing well, denies any urgent needs or questions at this time.  Patient continues to see therapist at [facility], sessions are going well per patient.  Jerson was denied for Social Security Disability at end of May 2025, patient planning to appeal- no update at this time.  No parking placard at this time, CHW has mailed application twice to patient, he states he has the paperwork not sure where it is.    CHW Plan: Continue with monthly outreach call to discuss, support and update CC goal progression    Next CHW outreach call: 8/7/2025    Vinita BOYLE  Community Health Worker  [facility] Care Coordination  [facility], [email or web address]  "[email or web address]  Office: [phone]

## 2025-07-10 ENCOUNTER — VIRTUAL VISIT (OUTPATIENT)
Dept: INTERNAL MEDICINE | Facility: CLINIC | Age: 60
End: 2025-07-10
Payer: COMMERCIAL

## 2025-07-10 DIAGNOSIS — F41.0 PANIC ATTACK: Primary | ICD-10-CM

## 2025-07-10 PROCEDURE — 98005 SYNCH AUDIO-VIDEO EST LOW 20: CPT | Performed by: INTERNAL MEDICINE

## 2025-07-10 RX ORDER — DIAZEPAM 5 MG/1
TABLET ORAL
Qty: 1 TABLET | Refills: 0 | Status: SHIPPED | OUTPATIENT
Start: 2025-07-10

## 2025-07-10 ASSESSMENT — PATIENT HEALTH QUESTIONNAIRE - PHQ9
SUM OF ALL RESPONSES TO PHQ QUESTIONS 1-9: 22
SUM OF ALL RESPONSES TO PHQ QUESTIONS 1-9: 22
10. IF YOU CHECKED OFF ANY PROBLEMS, HOW DIFFICULT HAVE THESE PROBLEMS MADE IT FOR YOU TO DO YOUR WORK, TAKE CARE OF THINGS AT HOME, OR GET ALONG WITH OTHER PEOPLE: EXTREMELY DIFFICULT

## 2025-07-10 NOTE — PROGRESS NOTES
Jerson is a 60 year old who is being evaluated via a billable video visit.    What phone number would you like to be contacted at? [phone]  How would you like to obtain your AVS? Mail a copy  {If patient encounters technical issues they should call [phone] :024767}    Assessment & Plan     (F41.0) Panic attack  (primary encounter diagnosis)  Comment: ***  Plan: diazepam (VALIUM) 5 MG tablet        ***    Depression Screening Follow Up        7/10/2025     2:40 PM   PHQ   PHQ-9 Total Score 22    Q9: Thoughts of better off dead/self-harm past 2 weeks Several days    F/U: Thoughts of suicide or self-harm Yes    F/U: Self harm-plan Yes    F/U: Self-harm action No    F/U: Safety concerns No        Proxy-reported     {Last PHQ9 Response (Optional):029228}      { Link to C-SSRS ([facility]) Flowsheet :688568}  {C-SSRS results from today (Required):513975}      {After C-SRSS completed-Select to see recommended follow up based on results (Optional):984665}  Follow Up Actions Taken  Patient to follow up with PCP.  Clinic staff to schedule appointment if able.    Discussed the following ways the patient can remain in a safe environment:  be around others  {Follow-up (Optional):924100}    Fariha Arthur is a 60 year old, presenting for the following health issues:  Medication Request and Recheck Medication (Pt reports that he's having this visit to discuss and request anxiety med to repeat MRI.)      7/10/2025     2:42 PM   Additional Questions   Roomed by estefany   Accompanied by alone         7/10/2025     2:42 PM   Patient Reported Additional Medications   Patient reports taking the following new medications none     Video Start Time: 5pm    History of Present Illness       Reason for visit:  Anxiety and medication request   He is taking medications regularly.      {ALERT  Recent PHQ-9/EPDS score indicates suicidal ideations, document follow-up within the A/P section of the note :886111}{Provider Documentation   Link to C-SSRS ([facility]) Flowsheet :554188}  {SUPERLIST (Optional):300070}  {additonal problems for provider to add (Optional):634472}    {ROS Picklists (Optional):390300}      Objective    Vitals - Patient Reported  Systolic (Patient Reported): (!) 142  Diastolic (Patient Reported): (!) 90        Physical Exam   GENERAL: alert and no distress  EYES: Eyes grossly normal to inspection.  No discharge or erythema, or obvious scleral/conjunctival abnormalities.  RESP: No audible wheeze, cough, or visible cyanosis.    SKIN: Visible skin clear. No significant rash, abnormal pigmentation or lesions.  NEURO: Cranial nerves grossly intact.  Mentation and speech appropriate for age.  PSYCH: Appropriate affect, tone, and pace of words    {Diagnostic Test Results (Optional):804424}      Video-Visit Details    Type of service:  Video Visit   Video End Time:5:04 PM  Originating Location (pt. Location): Home  {PROVIDER LOCATION On-site should be selected for visits conducted from your clinic location or adjoining [facility], academic office, or other nearby [facility] building. Off-site should be selected for all other provider locations, including home:665030}  Distant Location (provider location):  On-site  Platform used for Video Visit: Keyonna  Signed Electronically by: Clemente Frey MD  {Email feedback regarding this note to [email or web address]   :190563}

## 2025-07-14 DIAGNOSIS — E03.9 ACQUIRED HYPOTHYROIDISM: Primary | ICD-10-CM

## 2025-07-14 RX ORDER — LEVOTHYROXINE SODIUM 150 UG/1
TABLET ORAL
Qty: 180 TABLET | Refills: 0 | Status: SHIPPED | OUTPATIENT
Start: 2025-07-14

## 2025-07-18 ENCOUNTER — HOSPITAL ENCOUNTER (OUTPATIENT)
Dept: MRI IMAGING | Facility: HOSPITAL | Age: 60
Discharge: HOME | End: 2025-07-18
Attending: INTERNAL MEDICINE | Admitting: INTERNAL MEDICINE
Payer: COMMERCIAL

## 2025-07-18 PROCEDURE — 73221 MRI JOINT UPR EXTREM W/O DYE: CPT | Mod: RT

## 2025-07-21 ENCOUNTER — OFFICE VISIT (OUTPATIENT)
Dept: SLEEP MEDICINE | Facility: CLINIC | Age: 60
End: 2025-07-21
Payer: COMMERCIAL

## 2025-07-21 VITALS
HEIGHT: 62 IN | WEIGHT: 185.2 LBS | HEART RATE: 66 BPM | OXYGEN SATURATION: 98 % | BODY MASS INDEX: 34.08 KG/M2 | DIASTOLIC BLOOD PRESSURE: 88 MMHG | SYSTOLIC BLOOD PRESSURE: 131 MMHG

## 2025-07-21 DIAGNOSIS — G47.33 OSA (OBSTRUCTIVE SLEEP APNEA): Primary | ICD-10-CM

## 2025-07-21 PROCEDURE — 1125F AMNT PAIN NOTED PAIN PRSNT: CPT | Performed by: NURSE PRACTITIONER

## 2025-07-21 PROCEDURE — 99212 OFFICE O/P EST SF 10 MIN: CPT | Performed by: NURSE PRACTITIONER

## 2025-07-21 PROCEDURE — 3079F DIAST BP 80-89 MM HG: CPT | Performed by: NURSE PRACTITIONER

## 2025-07-21 PROCEDURE — 3075F SYST BP GE 130 - 139MM HG: CPT | Performed by: NURSE PRACTITIONER

## 2025-07-21 ASSESSMENT — SLEEP AND FATIGUE QUESTIONNAIRES
HOW LIKELY ARE YOU TO NOD OFF OR FALL ASLEEP WHILE SITTING AND READING: SLIGHT CHANCE OF DOZING
HOW LIKELY ARE YOU TO NOD OFF OR FALL ASLEEP WHILE SITTING QUIETLY AFTER LUNCH WITHOUT ALCOHOL: SLIGHT CHANCE OF DOZING
HOW LIKELY ARE YOU TO NOD OFF OR FALL ASLEEP WHILE LYING DOWN TO REST IN THE AFTERNOON WHEN CIRCUMSTANCES PERMIT: SLIGHT CHANCE OF DOZING
HOW LIKELY ARE YOU TO NOD OFF OR FALL ASLEEP WHILE SITTING AND TALKING TO SOMEONE: SLIGHT CHANCE OF DOZING
HOW LIKELY ARE YOU TO NOD OFF OR FALL ASLEEP WHILE WATCHING TV: SLIGHT CHANCE OF DOZING
HOW LIKELY ARE YOU TO NOD OFF OR FALL ASLEEP IN A CAR, WHILE STOPPED FOR A FEW MINUTES IN TRAFFIC: SLIGHT CHANCE OF DOZING
HOW LIKELY ARE YOU TO NOD OFF OR FALL ASLEEP WHILE SITTING INACTIVE IN A PUBLIC PLACE: SLIGHT CHANCE OF DOZING
HOW LIKELY ARE YOU TO NOD OFF OR FALL ASLEEP WHEN YOU ARE A PASSENGER IN A CAR FOR AN HOUR WITHOUT A BREAK: SLIGHT CHANCE OF DOZING

## 2025-07-21 NOTE — PROGRESS NOTES
Sleep Apnea - Follow-up Visit:    Impression/Plan:  1. JAZ (obstructive sleep apnea) (Primary)     Severe Sleep apnea. Tolerating PAP well. Daytime symptoms are improved.  The patient is meeting compliance requirements at this time.  He continues to use and benefit from PAP therapy.    A review of his APAP download data over the last 30 days shows excellent use and compliance and severe JAZ that is well-controlled on current pressure settings.    Continue current plan of care.  No new orders placed at this visit today.    Jerson Lynn will follow up in about 1 year(s).     Fifteen minutes spent with patient, all of which were spent face-to-face counseling, consulting, chart review/documentation, and coordinating plan of care on the date of the encounter.      TAMMIE Ramos CNP  Sleep Medicine      CC:  Clemente Frey,         History of Present Illness:  Chief Complaint   Patient presents with    CPAP Follow Up     CPAP follow up      *Due to language barrier, a Blink Messenger  was present via iPad during the history-taking and subsequent discussion (and for part of the physical exam) with this patient.     Jerson Lynn is a 60-year-old male with a PMH significant for HTN, HLD, DM 2, hypothyroidism, major depression, and obesity who presents.today for compliance follow-up of their severe sleep apnea, managed with CPAP.  He was last seen in an office visit with me on 3/17/2025 in follow-up to review sleep study results and a subsequent order for PAP therapy was placed at that time.  Patient has the following compliance requirements: usage only.     Previous Study Results:   1/27/2025 [facility] Sleep Study (215.0 lbs) - AHI 73.6, RDI 80.5, Supine .0, REM AHI 68.6, Low O2% 77.0%, Time Spent <=88% 17.5, Time Spent <=89% 22.0. Treatment was titrated to a pressure of CPAP 16 with an AHI 29.3. Time spent in REM supine at this pressure was - minutes.    DME: ME    Do you use a CPAP Machine at home:  (Patient-Rptd) Yes  Overall, on a scale of 0-10 how would you rate your CPAP (0 poor, 10 great):      What type of mask do you use: (Patient-Rptd) Nasal Mask  Is your mask comfortable: (Patient-Rptd) Yes  If not, why:      Is your mask leaking: (Patient-Rptd) No  If yes, where do you feel it:    How many night per week does the mask leak (0-7):      Do you notice snoring with mask on: (Patient-Rptd) No  Do you notice gasping arousals with mask on: (Patient-Rptd) No  Are you having significant oral or nasal dryness: (Patient-Rptd) No  Is the pressure setting comfortable: (Patient-Rptd) Yes  If not, why:      What is your typical bedtime:    How long does it take you to go to sleep on PAP therapy:    What time do you typically get out of bed for the day:    How many hours on average per night are you using PAP therapy: (Patient-Rptd) 5-6 HOURS  How many hours are you sleeping per night: (Patient-Rptd) 5 or6 hours  Do you feel well rested in the morning: (Patient-Rptd) Yes      ResMed AirSense 10 (set up on 3/24/2025 at [facility])  Auto-PAP 10.0 - 17.0 cmH2O 30 day usage data:  6/21/25 - 7/20/25  93% of days with > 4 hours of use. 0/30 days with no use.   Average use 335 minutes per day.   95%ile Leak 27.17 L/min.   CPAP 95% pressure 12.3 cm.   AHI 1.97 events per hour.        EPWORTH SLEEPINESS SCALE         7/21/2025    12:48 PM    [place] Sleepiness Scale ( AGUILAR Pinto  4550-3402<br>ESS - [place]/English - Final version - 21 Nov 07 - [employer] [place].)   Sitting and reading Slight chance of dozing   Watching TV Slight chance of dozing   Sitting, inactive in a public place (e.g. a theatre or a meeting) Slight chance of dozing   As a passenger in a car for an hour without a break Slight chance of dozing   Lying down to rest in the afternoon when circumstances permit Slight chance of dozing   Sitting and talking to someone Slight chance of dozing   Sitting quietly after a lunch without alcohol Slight chance of  dozing   In a car, while stopped for a few minutes in traffic Slight chance of dozing   Perry Score (MC) 8   Perry Score (Sleep) 8        Patient-reported       INSOMNIA SEVERITY INDEX (ASAD)          7/21/2025    12:40 PM   Insomnia Severity Index (ASAD)   Difficulty falling asleep 2   Difficulty staying asleep 2   Problems waking up too early 3   How SATISFIED/DISSATISFIED are you with your CURRENT sleep pattern? 4   How NOTICEABLE to others do you think your sleep problem is in terms of impairing the quality of your life? 3   How WORRIED/DISTRESSED are you about your current sleep problem? 4   To what extent do you consider your sleep problem to INTERFERE with your daily functioning (e.g. daytime fatigue, mood, ability to function at work/daily chores, concentration, memory, mood, etc.) CURRENTLY? 3   ASAD Total Score 21        Patient-reported       Guidelines for Scoring/Interpretation:  Total score categories:  0-7 = No clinically significant insomnia   8-14 = Subthreshold insomnia   15-21 = Clinical insomnia (moderate severity)  22-28 = Clinical insomnia (severe)  Used via courtesy of www.[employer].va.gov with permission from Jose Mccollum PhD., [facility]      Past medical/surgical history, family history, social history, medications and allergies were reviewed.        Problem List:  Patient Active Problem List    Diagnosis Date Noted    Arthritis of both hands 03/19/2025     Priority: Medium    JAZ (obstructive sleep apnea) 01/29/2025     Priority: Medium     1/27/2025 [facility] Sleep Study (215.0 lbs) - AHI 73.6, RDI 80.5, Supine .0, REM AHI 68.6, Low O2% 77.0%, Time Spent <=88% 17.5, Time Spent <=89% 22.0. Treatment was titrated to a pressure of CPAP 16 with an AHI 29.3. Time spent in REM supine at this pressure was - minutes.      Chronic pain of both knees 08/13/2024     Priority: Medium    Type 2 diabetes mellitus without complication, without long-term current use of insulin (H)  08/07/2024     Priority: Medium    Mixed hyperlipidemia 08/07/2024     Priority: Medium    Primary hypertension 08/07/2024     Priority: Medium    Acquired hypothyroidism 08/07/2024     Priority: Medium    Moderate episode of recurrent major depressive disorder (H) 08/07/2024     Priority: Medium      Current Outpatient Medications   Medication Sig Dispense Refill    aspirin (ASPIRIN LOW DOSE) 81 MG EC tablet TAKE 1 PILL BY MOUTH EVERY DAY / TXHUA HNUB NOJ 1 LUB TSHUAJ PAB KOM NTSHAV KHIAV ZOO 90 tablet 3    atorvastatin (LIPITOR) 40 MG tablet TAKE 1 PILL BY MOUTH EVERY NIGHT AT BEDTIME FOR HIGH CHOLESTEROL / TXHUA HMO NOJ 1 LUB TSHUAJ THAUM MUS PW PAB NTSHAV MUAJ ROJ      Blood Pressure Monitoring (COMFORT TOUCH BP CUFF/MEDIUM) MISC 1 each daily. 1 each 0    colchicine (COLCRYS) 0.6 MG tablet Take 1 tablet (0.6 mg) by mouth daily as needed for gout pain. 30 tablet 0    Continuous Glucose Sensor (FREESTYLE ALEC 3 PLUS SENSOR) MISC Change every 15 days. 6 each 11    Continuous Glucose Sensor (FREESTYLE ALEC 3 SENSOR) MISC Change every 14 days. 6 each 5    cyclobenzaprine (FLEXERIL) 10 MG tablet Take 1 tablet twice a day by oral route as needed for 15 days, for muscle cramping.      diazepam (VALIUM) 5 MG tablet Take once 30 minutes prior to MRI 1 tablet 0    diclofenac (VOLTAREN) 1 % topical gel Apply 2 g topically 4 times daily as needed for moderate pain. Apply thin layer to knuckles and knees 150 g 5    empagliflozin (JARDIANCE) 25 MG TABS tablet Take 1 tablet (25 mg) by mouth daily. 90 tablet 3    FLUoxetine (PROZAC) 20 MG capsule TAKE 1 CAPSULE (20MG) ALONG WITH FLUOXETINE 40MG TOTALING 60MG IN THE MORNING/ NOJ NROG LUB 40MG THAUM SAWV NTXOV 90 capsule 3    FLUoxetine (PROZAC) 40 MG capsule Take 1 capsule (40 mg) by mouth daily. Noj 1 capsule (40 mg) ntawm qhov ncauj txhua hnub. 90 capsule 3    gabapentin (NEURONTIN) 300 MG capsule Take 1 capsule twice a day by oral route as needed for 30 days, for nerve pain.  "     levothyroxine (SYNTHROID/LEVOTHROID) 150 MCG tablet TAKE 1 PILL BY MOUTH EVERY DAY FOR HYPOTHYROIDISM/ NOJ IB LUB IB HNUB PAB MADELINE LUB THYROID 180 tablet 0    losartan-hydrochlorothiazide (HYZAAR) 100-25 MG tablet Take 1 tablet by mouth daily. 90 tablet 2    lurasidone (LATUDA) 20 MG TABS tablet TAKE 1 TABLET (20 MG) BY MOUTH DAILY WITH FOOD. NOJ IB NTSIAV TSHUAJ TXHUA HNUB NROG ZAUB MOV UAS MUAJ TSAWG KAWG  CALORIE NTAU NTAU 30 tablet 1    metFORMIN (GLUCOPHAGE) 1000 MG tablet TAKE 1 PILL BY MOUTH TWO TIMES A DAY WITH MORNING AND EVENING MEAL/ TXHUA HNUB NOJ 1 LUB 2 ZAUG NROG TSHIAS THIAB NROG HMO PAB ZOO NTSHAV QAB ZIB      mirtazapine (REMERON) 15 MG tablet Take 1 tablet (15 mg) by mouth at bedtime. 30 tablet 1    OLANZapine (ZYPREXA) 5 MG tablet Take 1 tablet (5 mg) by mouth at bedtime. 30 tablet 0    prazosin (MINIPRESS) 2 MG capsule Take 1 capsule (2 mg) by mouth at bedtime. Noj ib ntsiav tshuaj (2mg) thaum pw. 30 capsule 3    Transparent Dressings (TEGADERM I.V. 2\"X2-1/4\") MISC 1 each every 14 days. 30 each 4     No current facility-administered medications for this visit.     /88   Pulse 66   Ht 1.575 m (5' 2.01\")   Wt 84 kg (185 lb 3.2 oz)   SpO2 98%   BMI 33.86 kg/m        This note was written with the assistance of the Dragon voice-dictation technology software. The final document, although reviewed, may contain errors. For corrections, please contact the office.    "

## 2025-07-21 NOTE — PATIENT INSTRUCTIONS
"MY INFORMATION ON SLEEP APNEA-  Jerson Lynn    DOCTOR : TAMMIE Ramos CNP  SLEEP CENTER :      MY CONTACT NUMBER:   [facility]  [phone]  [facility]   [phone]  [facility]   [phone]      [facility]  [phone]    DME:  [facility] [place] [address], [address]  [place] [address]  Phone: [phone]    Hours:  Mon - Fri: 8:00 a.m. - 4:30 p.m.  Sat: Closed  Sun: Closed      Key Points:  1. What is Obstructive Sleep Apnea (JAZ)? JAZ is the most common type of sleep apnea. Apnea literally means, \"without breath.\" It is characterized by repetitive pauses in breathing, despite continued effort to breathe, and is usually associated with a reduction in blood oxygen saturation. Apneas can last 10 to over 60 seconds. It is caused by narrowing or collapse of the upper airway as muscles relax during sleep.   2. What are the consequences of JAZ? Symptoms include: daytime sleepiness- possibly increasing the risk of falling asleep while driving, unrefreshing/restless sleep, snoring, insomnia, waking frequently to urinate, waking with heartburn or reflux, reduced concentration and memory, and morning headaches. Other health consequences may include development of high blood pressure. Untreated JAZ also can contribute to heart disease, stroke and diabetes.   3. What are the treatment options? In most situations, sleep apnea is a lifelong disease that must be managed with daily therapy. Continuous Positive Airway (CPAP) is the most reliable treatment. A mouthguard to hold your jaw forward is usually the next most reliable option. Other options include postioning devices (to keep you off your back), nasal valves, tongue retaining device, weight loss, surgery. There is more detail about these options toward the end of this document.  4. What are the most important things to " remember about using CPAP?     WHERE CAN I FIND MORE INFORMATION?    American Academy of Sleep Medicine Patient information on sleep disorders:  http://yoursleep.aasmnet.org    CPAP -  WHY AND HOW?                 Continuous positive airway pressure, or CPAP, is the most effective treatment for obstructive sleep apnea. It works by blowing room air, through a mask, to hold your throat open. A decision to use CPAP is a major step forward in the pursuit of a healthier life. The successful use of CPAP will help you breathe easier, sleep better and live healthier. Using CPAP can be a positive experience if you keep these santoyo points in mind:  Commitment  CPAP is not a quick fix for your problem. It involves a long-term commitment to improve your sleep and your health.    Communication  Stay in close communication with both your sleep doctor and your CPAP supplier. Ask lots of questions and seek help when you need it.    Consistency  Use CPAP all night, every night and for every nap. You will receive the maximum health benefits from CPAP when you use it every time that you sleep. This will also make it easier for your body to adjust to the treatment.    Correction  The first machine and mask that you try may not be the best ones for you. Work with your sleep doctor and your CPAP supplier to make corrections to your equipment selection. Ask about trying a different type of machine or mask if you have ongoing problems. Make sure that your mask is a good fit and learn to use your equipment properly.    Challenge  Tell a family member or close friend to ask you each morning if you used your CPAP the previous night. Have someone to challenge you to give it your best effort.    Connection   Your adjustment to CPAP will be easier if you are able to connect with others who use the same treatment. Ask your sleep doctor if there is a support group in your area for people who have sleep apnea, or look for one on the  "Internet.  Comfort   Increase your level of comfort by using a saline spray, decongestant or heated humidifier if CPAP irritates your nose, mouth or throat. Use your unit's \"ramp\" setting to slowly get used to the air pressure level. There may be soft pads you can buy that will fit over your mask straps. Look on www.CPAP.com for accessories that can help make CPAP use more comfortable.  Cleaning   Clean your mask, tubing and headgear on a regular basis. Put this time in your schedule so that you don't forget to do it. Check and replace the filters for your CPAP unit and humidifier.    Completion   Although you are never finished with CPAP therapy, you should reward yourself by celebrating the completion of your first month of treatment. Expect this first month to be your hardest period of adjustment. It will involve some trial and error as you find the machine, mask and pressure settings that are right for you.    Continuation  After your first month of treatment, continue to make a daily commitment to use your CPAP all night, every night and for every nap.    CPAP-Tips to starting with success:  Begin using your CPAP for short periods of time during the day while you watch TV or read.    Use CPAP every night and for every nap. Using it less often reduces the health benefits and makes it harder for your body to get used to it.    Newer CPAP models are virtually silent; however, if you find the sound of your CPAP machine to be bothersome, place the unit under your bed to dampen the sound.     Make small adjustments to your mask, tubing, straps and headgear until you get the right fit. Tightening the mask may actually worsen the leak.  If it leaves significant marks on your face or irritates the bridge of your nose, it may not be the best mask for you.  Speak with the person who supplied the mask and consider trying other masks. Insurances will allow you to try different masks during the first month of starting CPAP.  " Insurance also covers a new mask, hose and filter about every 6 months.    Use a saline nasal spray to ease mild nasal congestion. Neti-Pot or saline nasal rinses may also help. Nasal gel sprays can help reduce nasal dryness.  Biotene mouthwash can be helpful to protect your teeth if you experience frequent dry mouth.  Dry mouth may be a sign of air escaping out of your mouth or out of the mask in the case of a full face mask.  Speak with your provider if you expect that is the case.     Take a nasal decongestant to relieve more severe nasal or sinus congestion.  Do not use Afrin (oxymetazoline) nasal spray more than 3 days in a row.  Speak with your sleep doctor if your nasal congestion is chronic.    Use a heated humidifier that fits your CPAP model to enhance your breathing comfort. Adjust the heat setting up if you get a dry nose or throat, down if you get condensation in the hose or mask.  Position the CPAP lower than you so that any condensation in the hose drains back into the machine rather than towards the mask.    Try a system that uses nasal pillows if traditional masks give you problems.    Clean your mask, tubing and headgear once a week. Make sure the equipment dries fully.    Regularly check and replace the filters for your CPAP unit and humidifier.    Work closely with your sleep provider and your CPAP supplier to make sure that you have the machine, mask and air pressure setting that works best for you. It is better to stop using it and call your provider to solve problems than to lay awake all night frustrated with the device.  Weight Loss:    Weight loss decreases severity of sleep apnea in most people with obesity. For those with mild obesity who have developed snoring with weight gain, even 15-30 pound weight loss can improve and occasionally eliminate sleep apnea.  Structured and life-long dietary and health habits are necessary to lose weight and keep healthier weight levels.     Though there  are significant health benefits from weight loss, long-term weight loss is very difficult to achieve- studies show success with dietary management in less than 10% of people. In addition, substantial weight loss may require years of dietary control and may be difficult if patients have severe obesity. In these cases, surgical management may be considered.    If you are interested in methods for weight loss, you should review the options discussed at the National Institutes of Health patient information sites:     http://win.niddk.nih.gov/publications/index.htm  http:/www.health.nih.gov/topic/WeightLossDieting    Bariatric programs offer counseling in all methods of weight loss:    Http:/www.[email or web address]/Specialties/WeightLossSurgeryandMedicalMgmt/htm    Your BMI is Body mass index is 33.86 kg/m .    Weight management plan: Patient was referred to their PCP to discuss a diet and exercise plan.    Body mass index (BMI) is one way to tell whether you are at a healthy weight, overweight, or obese. It measures your weight in relation to your height.  A BMI of 18.5 to 24.9 is in the healthy range. A person with a BMI of 25 to 29.9 is considered overweight, and someone with a BMI of 30 or greater is considered obese.  Another way to find out if you are at risk for health problems caused by overweight and obesity is to measure your waist. If you are a woman and your waist is more than 35 inches, or if you are a man and your waist is more than 40 inches, your risk of disease may be higher.  More than two-thirds of American adults are considered overweight or obese. Being overweight or obese increases the risk for further weight gain.  Excess weight may lead to heart disease and diabetes. Creating and following plans for healthy eating and physical activity may help you improve your health.    Methods for maintaining or losing weight.    Weight control is part of healthy lifestyle and includes exercise, emotional  health, and healthy eating habits.  Careful eating habits lifelong is the mainstay of weight control.  Though there are significant health benefits from weight loss, long-term weight loss with diet alone may be very difficult to achieve- studies show long-term success with dietary management in less than 10% of people. Attaining a healthy weight may be especially difficult to achieve in those with severe obesity. In some cases, medications, devices and surgical management might be considered.    What can you do?    If you are overweight or obese and are interested in methods for weight loss, you should discuss this with your provider. In addition, we recommend that you review healthy life styles and methods for weight loss available through the National Institutes of Health patient information sites:     http://win.niddk.nih.gov/publications/index.htm

## 2025-07-22 ENCOUNTER — OFFICE VISIT (OUTPATIENT)
Dept: PHARMACY | Facility: CLINIC | Age: 60
End: 2025-07-22
Payer: COMMERCIAL

## 2025-07-22 ENCOUNTER — PATIENT OUTREACH (OUTPATIENT)
Dept: CARE COORDINATION | Facility: CLINIC | Age: 60
End: 2025-07-22
Payer: COMMERCIAL

## 2025-07-22 ENCOUNTER — TELEPHONE (OUTPATIENT)
Dept: INTERNAL MEDICINE | Facility: CLINIC | Age: 60
End: 2025-07-22
Payer: COMMERCIAL

## 2025-07-22 ENCOUNTER — VIRTUAL VISIT (OUTPATIENT)
Dept: INTERPRETER SERVICES | Facility: CLINIC | Age: 60
End: 2025-07-22
Payer: COMMERCIAL

## 2025-07-22 DIAGNOSIS — F33.1 MODERATE EPISODE OF RECURRENT MAJOR DEPRESSIVE DISORDER (H): ICD-10-CM

## 2025-07-22 DIAGNOSIS — M25.562 CHRONIC PAIN OF BOTH KNEES: ICD-10-CM

## 2025-07-22 DIAGNOSIS — F41.9 ANXIETY: Primary | ICD-10-CM

## 2025-07-22 DIAGNOSIS — E11.9 TYPE 2 DIABETES MELLITUS WITHOUT COMPLICATION, WITHOUT LONG-TERM CURRENT USE OF INSULIN (H): ICD-10-CM

## 2025-07-22 DIAGNOSIS — M25.561 CHRONIC PAIN OF BOTH KNEES: ICD-10-CM

## 2025-07-22 DIAGNOSIS — G47.09 OTHER INSOMNIA: ICD-10-CM

## 2025-07-22 DIAGNOSIS — G89.29 CHRONIC PAIN OF BOTH KNEES: ICD-10-CM

## 2025-07-22 PROCEDURE — 99606 MTMS BY PHARM EST 15 MIN: CPT

## 2025-07-22 RX ORDER — NAPROXEN 500 MG/1
500 TABLET ORAL 2 TIMES DAILY WITH MEALS
COMMUNITY

## 2025-07-22 RX ORDER — ASPIRIN 81 MG/1
81 TABLET, COATED ORAL DAILY
Qty: 90 TABLET | Refills: 3 | Status: SHIPPED | OUTPATIENT
Start: 2025-07-22

## 2025-07-22 NOTE — PATIENT INSTRUCTIONS
"Recommendations from today's MTM visit:                                                      MTM (medication therapy management) is a service provided by a clinical pharmacist designed to help you get the most of out of your medicines.   Today we reviewed what your medicines are for, how to know if they are working, that your medicines are safe and how to make your medicine regimen as easy as possible.      PharmD to send patient to healthcare coordinator to work on handicap parking permit    Please check if you are taking losartan - hydrochlorothiazide and Latuda   Please let your diabetes educator know that her A1C was 6.5% in March of this year    Follow-up: Due on 08/25/2025 @ 11 AM    It was great speaking with you today.  I value your experience and would be very thankful for your time in providing feedback in our clinic survey. In the next few days, you may receive an email or text message from [employer] with a link to a survey related to your  clinical pharmacist.\"     To schedule another MTM appointment, please call the clinic directly or you may call the MTM scheduling line at [phone].    My Clinical Pharmacist's contact information:                                                      Please feel free to contact me with any questions or concerns you have.        Stefanie Funk, PharmD     Medication Therapy Management (MTM) Pharmacist     [phone]     [email or web address]     [facility]  "

## 2025-07-22 NOTE — PROGRESS NOTES
Clinic Care Coordination Contact  Community Health Worker Follow Up    CHW assisted patient with filling out the disability parking pass application at the clinic. After the application was filled out it was placed at the  for Dr Frey to complete his portion.    CHW also assisted patient with scheduling an appointment for 7/30/25 with his PCP to go over the disability paperwork he had requested.       Merry BUCIO Ambulatory CHW  [facility] Care Coordination   [facility]   Office: [phone]

## 2025-07-22 NOTE — PROGRESS NOTES
Contact   Chart Review     Situation: Patient chart reviewed by .    Background: enrolled in care coordination.     Assessment: The parking certificate form was completed with patient by CHW on June 19th. It was given to PCP and it is lost at this time.  Patient has appt today with MTM and will attempt to redo the form while in clinic.     Plan/Recommendations: delegation in place.     Celi Hernandez,   [facility]  [phone]
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
## 2025-07-22 NOTE — TELEPHONE ENCOUNTER
July 22, 2025    [facility] Medical Necessity Form was received via fax for Dr. Frey.  Patient label was attached to paperwork and placed in provider's inbox to be signed.    Liliana Angelo

## 2025-07-28 ENCOUNTER — RESULTS FOLLOW-UP (OUTPATIENT)
Dept: INTERNAL MEDICINE | Facility: CLINIC | Age: 60
End: 2025-07-28
Payer: COMMERCIAL

## 2025-07-30 ENCOUNTER — OFFICE VISIT (OUTPATIENT)
Dept: INTERNAL MEDICINE | Facility: CLINIC | Age: 60
End: 2025-07-30
Payer: COMMERCIAL

## 2025-07-30 VITALS
SYSTOLIC BLOOD PRESSURE: 129 MMHG | HEIGHT: 60 IN | HEART RATE: 52 BPM | WEIGHT: 182.2 LBS | OXYGEN SATURATION: 97 % | RESPIRATION RATE: 16 BRPM | TEMPERATURE: 97.5 F | DIASTOLIC BLOOD PRESSURE: 80 MMHG | BODY MASS INDEX: 35.77 KG/M2

## 2025-07-30 DIAGNOSIS — E66.01 CLASS 2 SEVERE OBESITY WITH BODY MASS INDEX (BMI) OF 35 TO 39.9 WITH SERIOUS COMORBIDITY (H): ICD-10-CM

## 2025-07-30 DIAGNOSIS — E66.812 CLASS 2 SEVERE OBESITY WITH BODY MASS INDEX (BMI) OF 35 TO 39.9 WITH SERIOUS COMORBIDITY (H): ICD-10-CM

## 2025-07-30 DIAGNOSIS — E11.9 TYPE 2 DIABETES MELLITUS WITHOUT COMPLICATION, WITHOUT LONG-TERM CURRENT USE OF INSULIN (H): ICD-10-CM

## 2025-07-30 DIAGNOSIS — F33.1 MODERATE EPISODE OF RECURRENT MAJOR DEPRESSIVE DISORDER (H): Primary | ICD-10-CM

## 2025-07-30 PROCEDURE — 99214 OFFICE O/P EST MOD 30 MIN: CPT | Performed by: INTERNAL MEDICINE

## 2025-07-30 PROCEDURE — 3074F SYST BP LT 130 MM HG: CPT | Performed by: INTERNAL MEDICINE

## 2025-07-30 PROCEDURE — 1125F AMNT PAIN NOTED PAIN PRSNT: CPT | Performed by: INTERNAL MEDICINE

## 2025-07-30 PROCEDURE — 3079F DIAST BP 80-89 MM HG: CPT | Performed by: INTERNAL MEDICINE

## 2025-07-30 PROCEDURE — G2211 COMPLEX E/M VISIT ADD ON: HCPCS | Performed by: INTERNAL MEDICINE

## 2025-07-30 ASSESSMENT — PAIN SCALES - GENERAL: PAINLEVEL_OUTOF10: MODERATE PAIN (4)

## 2025-07-30 ASSESSMENT — PATIENT HEALTH QUESTIONNAIRE - PHQ9
SUM OF ALL RESPONSES TO PHQ QUESTIONS 1-9: 17
SUM OF ALL RESPONSES TO PHQ QUESTIONS 1-9: 17
10. IF YOU CHECKED OFF ANY PROBLEMS, HOW DIFFICULT HAVE THESE PROBLEMS MADE IT FOR YOU TO DO YOUR WORK, TAKE CARE OF THINGS AT HOME, OR GET ALONG WITH OTHER PEOPLE: VERY DIFFICULT

## 2025-07-30 NOTE — PROGRESS NOTES
Assessment & Plan     (F33.1) Moderate episode of recurrent major depressive disorder (H)  (primary encounter diagnosis)  Comment: prior disability forms filled in prior clinic. After review of psychiatric notes, I do agree that meaningful employment is not realistic at this time  Plan: forms to be filled today.    (E11.9) Type 2 diabetes mellitus without complication, without long-term current use of insulin (H)  Comment: has been reasonably well controlled  Plan: will be due for recheck later in the fall    (E66.812,  E66.01) Class 2 severe obesity with body mass index (BMI) of 35 to 39.9 with serious comorbidity (H)  Comment: recent increase, small.  Plan: ongoing work with this though limited recently due to multi-joint aches.     See me again in 2 months as scheduled    The longitudinal plan of care for the diagnosis(es)/condition(s) as documented were addressed during this visit. Due to the added complexity in care, I will continue to support Jerson in the subsequent management and with ongoing continuity of care.        Depression Screening Follow Up        7/30/2025    12:01 PM   PHQ   PHQ-9 Total Score 17    Q9: Thoughts of better off dead/self-harm past 2 weeks Several days    F/U: Thoughts of suicide or self-harm Yes    F/U: Self harm-plan No    F/U: Self-harm action No    F/U: Safety concerns No        Proxy-reported                     Follow Up Actions Taken  Referred patient back to mental health provider    Discussed the following ways the patient can remain in a safe environment:  be around others      Fariha Arthur is a 60 year old, presenting for the following health issues:  paper work for disability parking sticker and Recheck Medication (Pt reports that he's here to follow up on handicap parking sticker form.)      7/30/2025    12:01 PM   Additional Questions   Roomed by estefany   Accompanied by alone         7/30/2025    12:01 PM   Patient Reported Additional Medications   Patient reports  taking the following new medications none     History of Present Illness       Reason for visit:  Need disabled parking permit   He is taking medications regularly.                      Objective    /80 (BP Location: Left arm, Patient Position: Sitting, Cuff Size: Adult Regular)   Pulse 52   Temp 97.5  F (36.4  C) (Tympanic)   Resp 16   Ht 1.524 m (5')   Wt 82.6 kg (182 lb 3.2 oz)   SpO2 97%   BMI 35.58 kg/m    Body mass index is 35.58 kg/m .  Physical Exam               Signed Electronically by: Clemente Frey MD

## 2025-08-07 ENCOUNTER — PATIENT OUTREACH (OUTPATIENT)
Dept: CARE COORDINATION | Facility: CLINIC | Age: 60
End: 2025-08-07
Payer: COMMERCIAL

## 2025-08-25 ENCOUNTER — OFFICE VISIT (OUTPATIENT)
Dept: PHARMACY | Facility: CLINIC | Age: 60
End: 2025-08-25
Payer: COMMERCIAL

## 2025-08-25 DIAGNOSIS — E11.9 TYPE 2 DIABETES MELLITUS WITHOUT COMPLICATION, WITHOUT LONG-TERM CURRENT USE OF INSULIN (H): Primary | ICD-10-CM

## 2025-08-25 DIAGNOSIS — F41.9 ANXIETY: ICD-10-CM

## 2025-08-25 DIAGNOSIS — G47.09 OTHER INSOMNIA: ICD-10-CM

## 2025-08-25 DIAGNOSIS — F33.1 MODERATE EPISODE OF RECURRENT MAJOR DEPRESSIVE DISORDER (H): ICD-10-CM

## 2025-08-25 PROCEDURE — 99606 MTMS BY PHARM EST 15 MIN: CPT

## 2025-08-27 ENCOUNTER — PATIENT OUTREACH (OUTPATIENT)
Dept: CARE COORDINATION | Facility: CLINIC | Age: 60
End: 2025-08-27
Payer: COMMERCIAL

## 2025-09-04 ENCOUNTER — PATIENT OUTREACH (OUTPATIENT)
Dept: CARE COORDINATION | Facility: CLINIC | Age: 60
End: 2025-09-04
Payer: COMMERCIAL